# Patient Record
Sex: FEMALE | Race: WHITE | NOT HISPANIC OR LATINO | Employment: FULL TIME | URBAN - NONMETROPOLITAN AREA
[De-identification: names, ages, dates, MRNs, and addresses within clinical notes are randomized per-mention and may not be internally consistent; named-entity substitution may affect disease eponyms.]

---

## 2017-01-26 ENCOUNTER — OFFICE VISIT (OUTPATIENT)
Dept: ORTHOPEDIC SURGERY | Facility: CLINIC | Age: 57
End: 2017-01-26

## 2017-01-26 DIAGNOSIS — M17.12 PRIMARY OSTEOARTHRITIS OF LEFT KNEE: Primary | ICD-10-CM

## 2017-01-26 PROCEDURE — 20610 DRAIN/INJ JOINT/BURSA W/O US: CPT | Performed by: ORTHOPAEDIC SURGERY

## 2017-01-26 PROCEDURE — 99213 OFFICE O/P EST LOW 20 MIN: CPT | Performed by: ORTHOPAEDIC SURGERY

## 2017-01-26 RX ORDER — ESOMEPRAZOLE MAGNESIUM 40 MG/1
CAPSULE, DELAYED RELEASE ORAL
COMMUNITY
Start: 2017-01-18

## 2017-01-26 RX ADMIN — METHYLPREDNISOLONE ACETATE 160 MG: 80 INJECTION, SUSPENSION INTRA-ARTICULAR; INTRALESIONAL; INTRAMUSCULAR; SOFT TISSUE at 09:25

## 2017-01-26 RX ADMIN — LIDOCAINE HYDROCHLORIDE 2 ML: 10 INJECTION, SOLUTION INFILTRATION; PERINEURAL at 09:25

## 2017-02-01 RX ORDER — LIDOCAINE HYDROCHLORIDE 10 MG/ML
2 INJECTION, SOLUTION INFILTRATION; PERINEURAL
Status: COMPLETED | OUTPATIENT
Start: 2017-01-26 | End: 2017-01-26

## 2017-02-01 RX ORDER — METHYLPREDNISOLONE ACETATE 80 MG/ML
160 INJECTION, SUSPENSION INTRA-ARTICULAR; INTRALESIONAL; INTRAMUSCULAR; SOFT TISSUE
Status: COMPLETED | OUTPATIENT
Start: 2017-01-26 | End: 2017-01-26

## 2017-04-25 ENCOUNTER — OFFICE VISIT (OUTPATIENT)
Dept: ORTHOPEDIC SURGERY | Facility: CLINIC | Age: 57
End: 2017-04-25

## 2017-04-25 DIAGNOSIS — M17.12 PRIMARY OSTEOARTHRITIS OF LEFT KNEE: Primary | ICD-10-CM

## 2017-04-25 PROCEDURE — 99213 OFFICE O/P EST LOW 20 MIN: CPT | Performed by: ORTHOPAEDIC SURGERY

## 2017-04-25 PROCEDURE — 20610 DRAIN/INJ JOINT/BURSA W/O US: CPT | Performed by: ORTHOPAEDIC SURGERY

## 2017-04-25 RX ORDER — LIDOCAINE HYDROCHLORIDE 10 MG/ML
2 INJECTION, SOLUTION INFILTRATION; PERINEURAL
Status: COMPLETED | OUTPATIENT
Start: 2017-04-25 | End: 2017-04-25

## 2017-04-25 RX ORDER — METHYLPREDNISOLONE ACETATE 80 MG/ML
160 INJECTION, SUSPENSION INTRA-ARTICULAR; INTRALESIONAL; INTRAMUSCULAR; SOFT TISSUE
Status: COMPLETED | OUTPATIENT
Start: 2017-04-25 | End: 2017-04-25

## 2017-04-25 RX ADMIN — METHYLPREDNISOLONE ACETATE 160 MG: 80 INJECTION, SUSPENSION INTRA-ARTICULAR; INTRALESIONAL; INTRAMUSCULAR; SOFT TISSUE at 14:55

## 2017-04-25 RX ADMIN — LIDOCAINE HYDROCHLORIDE 2 ML: 10 INJECTION, SOLUTION INFILTRATION; PERINEURAL at 14:55

## 2017-04-25 NOTE — PROGRESS NOTES
Chief Complaint   Patient presents with   • Left Knee - Follow-up           HPI the patient is here today for a follow up of her left knee. Patient has pain and discomfort on the medial aspect the knee.  For the past 6-8 weeks she states that she has been pretty miserable.  The knee swells up and wants to give out on her.  She has difficulty in going up and down the steps.  She has difficulty with squatting on the ground.  Knee presents with a sense of tightness and it makes her limp.  When she is limping of the knee her SI joint flares up and is quite miserable for her as well.  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  She is thinking about a total knee arthroplasty in 2018.  The risks and benefits of that surgical intervention have been discussed with the patient in great detail.        There were no vitals filed for this visit.        Review of Systems   All other systems reviewed and are negative.          Physical Exam   Constitutional: She appears well-developed.   HENT:   Head: Normocephalic.   Eyes: Pupils are equal, round, and reactive to light.   Neck: Normal range of motion.   Cardiovascular: Normal rate and intact distal pulses.    Pulmonary/Chest: Effort normal and breath sounds normal.   Abdominal: Soft. Bowel sounds are normal.   Neurological: She is alert. She has normal reflexes.   Skin: Skin is warm.   Psychiatric: She has a normal mood and affect. Her behavior is normal. Thought content normal.   Nursing note and vitals reviewed.          Joint/Body Part Specific Exam:  left knee. Patient has crepitus throughout range of motion. Positive patellar grind test. Mild effusion. Lachman is negative. Pivot shift is negative. Anterior and posterior drawer signs are negative. Significant joint line tenderness is noted on  the medial aspect of the knee. Patient has a varus orientation of the knee. Range of motion in flexion is for 0- 110° degrees. Neurovascular status intact.  Dorsalis pedis and posterior tibial artery pulses are palpable. Common peroneal nerve function is well preserved. Patients gait is cautious and antalgic. Skin and soft tissues are swollen; consistent with synovitis and effusion      X-RAY Report:        Diagnostics:        Rain was seen today for follow-up.    Diagnoses and all orders for this visit:    Primary osteoarthritis of left knee  -     Large Joint Arthrocentesis          Large Joint Arthrocentesis  Date/Time: 4/25/2017 2:55 PM  Consent given by: patient  Site marked: site marked  Timeout: Immediately prior to procedure a time out was called to verify the correct patient, procedure, equipment, support staff and site/side marked as required   Supporting Documentation  Indications: pain   Procedure Details  Location: knee - L knee  Preparation: Patient was prepped and draped in the usual sterile fashion  Needle size: 25 G  Approach: anteromedial  Medications administered: 2 mL lidocaine 1 %; 160 mg methylPREDNISolone acetate 80 MG/ML  Patient tolerance: patient tolerated the procedure well with no immediate complications              Plan:  Injected patient's left knee with a steroid from an anteromedial approach.    Extensive discussion about different treatment modalities including spacing of the steroid injections along with the Synvisc Visco supplementation and her travel plans.    Tablet ibuprofen 600 mg tab 1 by mouth daily for pain and swelling.    Calcium and vitamin D for bone health.    Use a supportive brace to the knee to prevent it from buckling and giving out.    Follow-up in my office in 8 weeks for re-considerations possibly a Synvisc injection at that visit.    We will call the patient's insurance company for Synvisc preauthorization.

## 2017-06-15 ENCOUNTER — CLINICAL SUPPORT (OUTPATIENT)
Dept: ORTHOPEDIC SURGERY | Facility: CLINIC | Age: 57
End: 2017-06-15

## 2017-06-15 DIAGNOSIS — M17.12 PRIMARY OSTEOARTHRITIS OF LEFT KNEE: Primary | ICD-10-CM

## 2017-06-15 PROCEDURE — 20610 DRAIN/INJ JOINT/BURSA W/O US: CPT | Performed by: ORTHOPAEDIC SURGERY

## 2017-06-15 PROCEDURE — 99213 OFFICE O/P EST LOW 20 MIN: CPT | Performed by: ORTHOPAEDIC SURGERY

## 2017-06-15 RX ADMIN — LIDOCAINE HYDROCHLORIDE 2 ML: 10 INJECTION, SOLUTION INFILTRATION; PERINEURAL at 15:35

## 2017-06-15 NOTE — PROGRESS NOTES
Rain Camarena  ?  1960  ?  Chief Complaint   Patient presents with   • Left Knee - Follow-up     ?  HPI the patient is here today for a follow up of her left knee. Patient continues to have a lot of pain and swelling on the medial aspect of the left knee.  She has difficulty with walking on uneven surfaces in going up and down the steps.  She tries to be as active as possible including riding her bike and exercising but a combination of the pain and discomfort from her knee and her SI joint limited her ability to exercise.  She's had fairly good outcomes with both steroid injections as well as Synvisc Visco supplementation and she's been thinking about a knee replacement sometime later this fall.          . Physical Exam   Constitutional: She is oriented to person, place, and time. She appears well-nourished.   HENT:   Head: Atraumatic.   Eyes: EOM are normal.   Neck: Neck supple.   Cardiovascular: Normal rate and intact distal pulses.    Pulmonary/Chest: Effort normal and breath sounds normal.   Abdominal: Soft. Bowel sounds are normal.   Musculoskeletal: Normal range of motion.   Neurological: She is alert and oriented to person, place, and time. She has normal reflexes.   Skin: Skin is warm.   Psychiatric: She has a normal mood and affect. Her behavior is normal. Judgment and thought content normal.   Nursing note and vitals reviewed.      Review of Systems   Constitutional: Negative.    HENT: Negative.    Eyes: Negative.    Respiratory: Negative.    Cardiovascular: Negative.    Gastrointestinal: Negative.    Endocrine: Negative.    Genitourinary: Negative.    Allergic/Immunologic: Negative.    Neurological: Negative.    Hematological: Negative.    Psychiatric/Behavioral: Negative.        Joint/Body Part Specific Exam:   left knee. Patient has crepitus throughout range of motion. Positive patellar grind test. Mild effusion. Lachman is negative. Pivot shift is negative. Anterior and posterior drawer signs  are negative. Significant joint line tenderness is noted on the medial aspect of the knee. Patient has a varus orientation of the knee. Range of motion in flexion is for 0- 120° degrees. Neurovascular status intact.  Dorsalis pedis and posterior tibial artery pulses are palpable. Common peroneal nerve function is well preserved. Patients gait is cautious and antalgic. Skin and soft tissues are swollen; consistent with synovitis and effusion  X-Ray Report:  No x-rays were taken at this visit.   Diagnostics:    Large Joint Arthrocentesis  Date/Time: 6/15/2017 3:35 PM  Consent given by: patient  Site marked: site marked  Timeout: Immediately prior to procedure a time out was called to verify the correct patient, procedure, equipment, support staff and site/side marked as required   Supporting Documentation  Indications: pain   Procedure Details  Location: knee - L knee  Preparation: Patient was prepped and draped in the usual sterile fashion  Needle size: 25 G  Approach: anteromedial  Medications administered: 2 mL lidocaine 1 %; 48 mg hylan 48 MG/6ML  Patient tolerance: patient tolerated the procedure well with no immediate complications        ?  ?  Plan      · Ice pack for 48 hrs     · Injected patients Left knee joint with Visco supplementation with Synvisc    · Ace wrap for swelling PRN    · Elevation for swelling PRN    · Tablet Ibuprofen 600 mg P.O. BID x 5 Days with meals    · Call office for any problems  With procedure    · Home Physical Therapy Program discussed with patient    · F/U in 3 months for Re-evaluation.  ·   · Patient is going to need a total knee arthroplasty on the left side because her quality of life is quite negative and she would like to get out and exercise and be relatively pain-free..  ·   · Risks and benefits of surgical intervention and been discussed with the patient in extensive detail and she understands and accepts.  ·   · She will let us know when she is ready for surgical  intervention.

## 2017-06-23 RX ORDER — LIDOCAINE HYDROCHLORIDE 10 MG/ML
2 INJECTION, SOLUTION INFILTRATION; PERINEURAL
Status: COMPLETED | OUTPATIENT
Start: 2017-06-15 | End: 2017-06-15

## 2017-10-10 ENCOUNTER — OFFICE VISIT (OUTPATIENT)
Dept: ORTHOPEDIC SURGERY | Facility: CLINIC | Age: 57
End: 2017-10-10

## 2017-10-10 DIAGNOSIS — M17.12 PRIMARY OSTEOARTHRITIS OF LEFT KNEE: Primary | ICD-10-CM

## 2017-10-10 PROCEDURE — 20610 DRAIN/INJ JOINT/BURSA W/O US: CPT | Performed by: ORTHOPAEDIC SURGERY

## 2017-10-10 PROCEDURE — 73560 X-RAY EXAM OF KNEE 1 OR 2: CPT | Performed by: ORTHOPAEDIC SURGERY

## 2017-10-10 PROCEDURE — 99213 OFFICE O/P EST LOW 20 MIN: CPT | Performed by: ORTHOPAEDIC SURGERY

## 2017-10-10 RX ADMIN — LIDOCAINE HYDROCHLORIDE 2 ML: 10 INJECTION, SOLUTION INFILTRATION; PERINEURAL at 15:45

## 2017-10-10 RX ADMIN — METHYLPREDNISOLONE ACETATE 160 MG: 80 INJECTION, SUSPENSION INTRA-ARTICULAR; INTRALESIONAL; INTRAMUSCULAR; SOFT TISSUE at 15:45

## 2017-10-10 NOTE — PROGRESS NOTES
Rain Camarena  ?  1960  ?  Chief Complaint   Patient presents with   • Left Knee - Follow-up     ?  HPI the patient is here today for left knee follow up For her pain and discomfort.  She states that her quality of life is very negative because of the progressive pain and discomfort on the knee.  She had Synvisc Visco supplementation on Sabiha 15, 2017.  That did seem to help her to some degree but her quality of life is very negative because of not being able to participate in the activities that she would like to.  She states that she recently went to a [employer] park in [place] and was essentially unable to perform any form of hiking or exercise.  She states that she loves to walk outside for recreation and is unable to do so because she cannot go the distance that she would like to on account of her knee pain and discomfort.  She is limping just about all the time.  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  Most of her symptoms appear to be from the patellofemoral joint.  She has difficulty in going up and down the steps and difficulty in squatting on the ground.  She states that her shoulder bothers her significantly on the right side as well.  She has weakness in abduction and difficulty in reaching into the overhead position without too much pain and discomfort.    Physical Exam    Review of Systems    Joint/Body Part Specific Exam:   left knee. Patient has crepitus throughout range of motion. Positive patellar grind test. Mild effusion. Lachman is negative. Pivot shift is negative. Anterior and posterior drawer signs are negative. Significant joint line tenderness is noted on the medial aspect of the knee. Patient has a varus orientation of the knee. Range of motion in flexion is for 0- 100° degrees. Neurovascular status intact.  Dorsalis pedis and posterior tibial artery pulses are palpable. Common peroneal  nerve function is well preserved. Patients gait is cautious and antalgic. Skin and soft tissues are swollen; consistent with synovitis and effusion.  She has difficulty in getting out of the chair starting the gait cycle.  Her first few steps are associated with a distinct limp.    right Shoulder. Patient has signs of impingement with internal and external rotation. There is a lot of pain and tenderness for the patient over the subcromial bursa. Rivas’s sign is positive. Neer sign is positive. Forward flexion is 0-120° degrees, abduction is 0-130 degrees, external rotation is 0-40° degrees. Rotator cuff function is fairly well preserved except for the impingement at 90 degrees. Apprehension sign is negative. Axillary nerve function is well preserved. Radial artery pulses are palpable. There is no evidence of multidirectional instability. Sulcus sign is negative. Drop arm sign is negative. The patient has some ill-defined tenderness over the greater tuberosity of the humerus. The pain level is 5.  X-Ray Report:    Diagnostics:    Large Joint Arthrocentesis  Date/Time: 10/10/2017 3:45 PM  Consent given by: patient  Site marked: site marked  Timeout: Immediately prior to procedure a time out was called to verify the correct patient, procedure, equipment, support staff and site/side marked as required   Supporting Documentation  Indications: pain   Procedure Details  Location: knee - L knee  Preparation: Patient was prepped and draped in the usual sterile fashion  Needle size: 25 G  Approach: anteromedial  Medications administered: 2 mL lidocaine 1 %; 160 mg methylPREDNISolone acetate 80 MG/ML  Patient tolerance: patient tolerated the procedure well with no immediate complications        ?  ?  Plan      · Ice pack for 48 hrs     · Injected patients left knee joint with Steroid    · Ace wrap for swelling PRN    · Elevation for swelling PRN    · Tablet Ibuprofen 600 mg P.O. BID x 5 Days with meals    · Call office for  any problems  With procedure    · Home Physical Therapy Program discussed with patient    · F/U in 3 months for Re-evaluation.  ·   · Calcium and vitamin D for bone health.  ·   · Glucosamine and chondroitin discussed with the patient for improving the cartilage health.  ·   · Home-based exercise program the shoulder to prevent a frozen shoulder.  ·   · Patient wants to proceed with a total knee arthroplasty on the left side in May 2018.  I certainly agree with this timeline.  ·   · Risks and benefits of surgical intervention discussed with the patient and her  who is present in the office with her today.  ·   · Use a brace on the knee to prevent it from buckling and giving out.

## 2017-10-15 RX ORDER — METHYLPREDNISOLONE ACETATE 80 MG/ML
160 INJECTION, SUSPENSION INTRA-ARTICULAR; INTRALESIONAL; INTRAMUSCULAR; SOFT TISSUE
Status: COMPLETED | OUTPATIENT
Start: 2017-10-10 | End: 2017-10-10

## 2017-10-15 RX ORDER — LIDOCAINE HYDROCHLORIDE 10 MG/ML
2 INJECTION, SOLUTION INFILTRATION; PERINEURAL
Status: COMPLETED | OUTPATIENT
Start: 2017-10-10 | End: 2017-10-10

## 2018-01-16 ENCOUNTER — CLINICAL SUPPORT (OUTPATIENT)
Dept: ORTHOPEDIC SURGERY | Facility: CLINIC | Age: 58
End: 2018-01-16

## 2018-01-16 DIAGNOSIS — M17.12 PRIMARY OSTEOARTHRITIS OF LEFT KNEE: Primary | ICD-10-CM

## 2018-01-16 PROCEDURE — 99213 OFFICE O/P EST LOW 20 MIN: CPT | Performed by: ORTHOPAEDIC SURGERY

## 2018-01-16 PROCEDURE — 20610 DRAIN/INJ JOINT/BURSA W/O US: CPT | Performed by: ORTHOPAEDIC SURGERY

## 2018-01-16 RX ADMIN — METHYLPREDNISOLONE ACETATE 160 MG: 80 INJECTION, SUSPENSION INTRA-ARTICULAR; INTRALESIONAL; INTRAMUSCULAR; SOFT TISSUE at 13:05

## 2018-01-16 RX ADMIN — LIDOCAINE HYDROCHLORIDE 2 ML: 10 INJECTION, SOLUTION EPIDURAL; INFILTRATION; INTRACAUDAL; PERINEURAL at 13:05

## 2018-01-16 NOTE — PROGRESS NOTES
Rain Camarena  ?  1960  ?  Chief Complaint   Patient presents with   • Left Knee - Follow-up     ?  HPI the patient is here today for a follow up of her left knee. Patient has pain and discomfort on the medial aspect of the knee.  She has difficulty going up and down the steps.  She states that the Synvisc injection did not help her when she had received several months ago.  In any event, the insurance company denied her Synvisc injection.  She states that the steroid injection intra-articularly helps her significantly.  She has had a consultation with a rheumatologist specialist in [facility].  Patient is getting periodic checkups on a blood work and serum levels with that physician.  We have had an extensive discussion about an exercise protocol for this patient and, with a daily scheduled that would be helpful for managing her symptoms with conservative, nonoperative care.  Eventually, this patient is going to need a total knee arthroplasty and there is no doubt about that.  However, at this time she would like to proceed with conservative, nonoperative care and I totally agree with that.  Her SI joints have not been bothering her too much and therefore she is not asking those issues to be addressed at today's visit.    Physical Exam   Constitutional: She is oriented to person, place, and time. She appears well-nourished.   HENT:   Head: Atraumatic.   Eyes: EOM are normal.   Neck: Neck supple.   Cardiovascular: Normal rate, regular rhythm, normal heart sounds and intact distal pulses.    Pulmonary/Chest: Effort normal.   Abdominal: Bowel sounds are normal.   Musculoskeletal: She exhibits edema, tenderness and deformity.   Neurological: She is alert and oriented to person, place, and time. She has normal reflexes.   Skin: Skin is dry.   Psychiatric: She has a normal mood and affect. Her behavior is normal. Judgment and thought content normal.   Nursing note and vitals reviewed.      Review of  Systems   Constitutional: Negative.    HENT: Negative.    Eyes: Negative.    Respiratory: Negative.    Cardiovascular: Negative.    Gastrointestinal: Negative.    Endocrine: Negative.    Genitourinary: Negative.    Musculoskeletal: Negative.    Skin: Negative.    Allergic/Immunologic: Negative.    Neurological: Negative.    Hematological: Negative.    Psychiatric/Behavioral: Negative.        Joint/Body Part Specific Exam:   left knee. Patient has crepitus throughout range of motion. Positive patellar grind test. Mild effusion. Lachman is negative. Pivot shift is negative. Anterior and posterior drawer signs are negative. Significant joint line tenderness is noted on the medial aspect of the knee. Patient has a varus orientation of the knee. Range of motion in flexion is for 0- 125° degrees. Neurovascular status intact.  Dorsalis pedis and posterior tibial artery pulses are palpable. Common peroneal nerve function is well preserved. Patients gait is cautious and antalgic. Skin and soft tissues are swollen; consistent with synovitis and effusion.  Patient has a distinct limp when she first gets out of the chair and starts to walk.  Once she has taken a few steps her limp does get better.  There is a distinct radiation of the pain along the medial aspect of the tibia.  There is also some swelling of the Pes Anserine tendons.  X-Ray Report:    Diagnostics:    Large Joint Arthrocentesis  Date/Time: 1/16/2018 1:05 PM  Consent given by: patient  Site marked: site marked  Timeout: Immediately prior to procedure a time out was called to verify the correct patient, procedure, equipment, support staff and site/side marked as required   Supporting Documentation  Indications: pain   Procedure Details  Location: knee - L knee  Preparation: Patient was prepped and draped in the usual sterile fashion  Needle size: 25 G  Approach: anteromedial  Medications administered: 2 mL lidocaine PF 1% 1 %; 160 mg methylPREDNISolone acetate 80  MG/ML  Patient tolerance: patient tolerated the procedure well with no immediate complications        ?  ?  Plan      · Ice pack for 48 hrs     · Injected patients left knee joint with Steroid    · Ace wrap for swelling PRN    · Elevation for swelling PRN    · Tablet Ibuprofen 600 mg P.O. BID x 5 Days with meals    · Call office for any problems  With procedure    · Home Physical Therapy Program discussed with patient    · F/U in 3 months for Re-evaluation.  ·   · Weekly exercise schedule charted out for the patient.  ·   · Continue to follow-up with the rheumatology specialist.  ·   · [employer] exercise program to help with management of the symptoms off the low back pain and the SI joint syndrome.

## 2018-01-18 RX ORDER — METHYLPREDNISOLONE ACETATE 80 MG/ML
160 INJECTION, SUSPENSION INTRA-ARTICULAR; INTRALESIONAL; INTRAMUSCULAR; SOFT TISSUE
Status: COMPLETED | OUTPATIENT
Start: 2018-01-16 | End: 2018-01-16

## 2018-01-18 RX ORDER — LIDOCAINE HYDROCHLORIDE 10 MG/ML
2 INJECTION, SOLUTION EPIDURAL; INFILTRATION; INTRACAUDAL; PERINEURAL
Status: COMPLETED | OUTPATIENT
Start: 2018-01-16 | End: 2018-01-16

## 2018-04-26 ENCOUNTER — CLINICAL SUPPORT (OUTPATIENT)
Dept: ORTHOPEDIC SURGERY | Facility: CLINIC | Age: 58
End: 2018-04-26

## 2018-04-26 VITALS — TEMPERATURE: 97.3 F | BODY MASS INDEX: 30.61 KG/M2 | WEIGHT: 195 LBS | HEIGHT: 67 IN

## 2018-04-26 DIAGNOSIS — M25.562 CHRONIC PAIN OF LEFT KNEE: ICD-10-CM

## 2018-04-26 DIAGNOSIS — G89.29 CHRONIC PAIN OF LEFT KNEE: ICD-10-CM

## 2018-04-26 DIAGNOSIS — M17.12 PRIMARY OSTEOARTHRITIS OF LEFT KNEE: Primary | ICD-10-CM

## 2018-04-26 DIAGNOSIS — M54.2 CERVICAL SPINE PAIN: ICD-10-CM

## 2018-04-26 PROCEDURE — 20610 DRAIN/INJ JOINT/BURSA W/O US: CPT | Performed by: ORTHOPAEDIC SURGERY

## 2018-04-26 PROCEDURE — 99213 OFFICE O/P EST LOW 20 MIN: CPT | Performed by: ORTHOPAEDIC SURGERY

## 2018-04-26 RX ORDER — METHYLPREDNISOLONE ACETATE 80 MG/ML
160 INJECTION, SUSPENSION INTRA-ARTICULAR; INTRALESIONAL; INTRAMUSCULAR; SOFT TISSUE
Status: COMPLETED | OUTPATIENT
Start: 2018-04-26 | End: 2018-04-26

## 2018-04-26 RX ORDER — LIDOCAINE HYDROCHLORIDE 10 MG/ML
2 INJECTION, SOLUTION EPIDURAL; INFILTRATION; INTRACAUDAL; PERINEURAL
Status: COMPLETED | OUTPATIENT
Start: 2018-04-26 | End: 2018-04-26

## 2018-04-26 RX ADMIN — METHYLPREDNISOLONE ACETATE 160 MG: 80 INJECTION, SUSPENSION INTRA-ARTICULAR; INTRALESIONAL; INTRAMUSCULAR; SOFT TISSUE at 08:54

## 2018-04-26 RX ADMIN — LIDOCAINE HYDROCHLORIDE 2 ML: 10 INJECTION, SOLUTION EPIDURAL; INFILTRATION; INTRACAUDAL; PERINEURAL at 08:54

## 2018-04-26 NOTE — PROGRESS NOTES
Chief Complaint   Patient presents with   • Left Knee - Follow-up         HPI  Patient is here today for a follow up of her left knee.She has pain and discomfort on the posterior and medial aspect of the knee.  She has difficulty going up and down the steps.  She has difficulty with squatting on the ground.  Cross body activities bother the patient significantly.  She states that her arthritis has flared up systematically.  She is having pain and discomfort in the cervical spine.  The pain radiates down into the shoulder region also.  She has a sense of muscle spasm over the real-scapular muscles.  She denies any history of trauma.  She does use a topical anti-inflammatory cream that is helpful with managing her symptoms.  She states that she visited the physical therapy department in the past and that was useful modality of control of pain for her.  We are trying to manage her knee arthritis and inflammation in a conservative fashion for as long as possible.          Vitals:    04/26/18 0850   Temp: 97.3 °F (36.3 °C)           Joint/Body Part Specific Exam:  left knee. Patient has crepitus throughout range of motion. Positive patellar grind test. Mild effusion. Lachman is negative. Pivot shift is negative. Anterior and posterior drawer signs are negative. Significant joint line tenderness is noted on the medial aspect of the knee. Patient has a varus orientation of the knee. There is fullness and tenderness in the Popliteal fossa. Mild distention of a Popliteal cyst is noted in this location. Range of motion in flexion is from 0- 120° degrees. Neurovascular status is intact.  Dorsalis pedis and posterior tibial artery pulses are palpable. Common peroneal nerve function is well preserved. Patient's gait is cautious and antalgic. Skin and soft tissues are mildly swollen, consistent with synovitis and effusion. The patient has a significant limp with the first few steps after starting the gait cycle. Getting out of a  chair takes a lot of effort due to pain on knee flexion.    Cervical Spine: Skin and soft tissues are mildly swollen. Deep tendon reflexes bilateral, symmetric and equal on both upper and lower extremities. Cough impulse is positive and invokes pain for the patient. No objective motor or sensory function deficit is present. No long tract signs re evident clinically.There is no evidence of myelopathy. No bowel or bladder deficit. Mild spasm of erector spinae muscle is present.Lateral rotations of the spine are limited in range of motion and painful for the patient. No evidence of long tract signs is noted.  bilaterally Sided lateral rotation is associated with pain and discomfort.  X-RAY REPORT:        Rain was seen today for follow-up.    Diagnoses and all orders for this visit:    Primary osteoarthritis of left knee    Chronic pain of left knee            Large Joint Arthrocentesis  Date/Time: 4/26/2018 8:54 AM  Consent given by: patient  Site marked: site marked  Timeout: Immediately prior to procedure a time out was called to verify the correct patient, procedure, equipment, support staff and site/side marked as required   Supporting Documentation  Indications: pain   Procedure Details  Location: knee - L knee  Preparation: Patient was prepped and draped in the usual sterile fashion  Needle size: 25 G  Approach: anteromedial  Medications administered: 160 mg methylPREDNISolone acetate 80 MG/ML; 2 mL lidocaine PF 1% 1 %  Patient tolerance: patient tolerated the procedure well with no immediate complications              Instructions:      Plan:Injected patient's left knee from an anteromedial approach with a steroid.    Use a supportive brace on the knee to prevent it from buckling and giving out.    Tablet Tylenol 650 mg tab 1 by mouth daily at bedtime for pain swelling and discomfort.    Topical application of a anti-inflammatory salve to help with pain and inflammation.    Cervical spine stretching exercises  including a [employer] exercise program.    Note for physical therapy given to the patient for 2 visits to the PT department to see if she can be helped by some modalities of traction.    Loperamide my office in 3 months for reevaluation of the knee problems.

## 2018-07-26 ENCOUNTER — CLINICAL SUPPORT (OUTPATIENT)
Dept: ORTHOPEDIC SURGERY | Facility: CLINIC | Age: 58
End: 2018-07-26

## 2018-07-26 VITALS — WEIGHT: 185 LBS | HEIGHT: 69 IN | BODY MASS INDEX: 27.4 KG/M2

## 2018-07-26 DIAGNOSIS — M17.12 PRIMARY OSTEOARTHRITIS OF LEFT KNEE: Primary | ICD-10-CM

## 2018-07-26 DIAGNOSIS — M25.511 ACUTE PAIN OF RIGHT SHOULDER: ICD-10-CM

## 2018-07-26 DIAGNOSIS — M54.2 CERVICAL SPINE PAIN: ICD-10-CM

## 2018-07-26 PROCEDURE — 20610 DRAIN/INJ JOINT/BURSA W/O US: CPT | Performed by: ORTHOPAEDIC SURGERY

## 2018-07-26 PROCEDURE — 99213 OFFICE O/P EST LOW 20 MIN: CPT | Performed by: ORTHOPAEDIC SURGERY

## 2018-07-26 RX ORDER — METHYLPREDNISOLONE ACETATE 80 MG/ML
160 INJECTION, SUSPENSION INTRA-ARTICULAR; INTRALESIONAL; INTRAMUSCULAR; SOFT TISSUE
Status: COMPLETED | OUTPATIENT
Start: 2018-07-26 | End: 2018-07-26

## 2018-07-26 RX ORDER — LIDOCAINE HYDROCHLORIDE 10 MG/ML
2 INJECTION, SOLUTION INFILTRATION; PERINEURAL
Status: COMPLETED | OUTPATIENT
Start: 2018-07-26 | End: 2018-07-26

## 2018-07-26 RX ORDER — TRAMADOL HYDROCHLORIDE 50 MG/1
TABLET ORAL
COMMUNITY
Start: 2018-06-11 | End: 2023-03-13

## 2018-07-26 RX ORDER — DOXEPIN HYDROCHLORIDE 150 MG/1
CAPSULE ORAL
COMMUNITY
Start: 2018-04-24 | End: 2023-03-13

## 2018-07-26 RX ORDER — BACLOFEN 10 MG/1
TABLET ORAL
COMMUNITY
Start: 2018-06-12 | End: 2019-12-03 | Stop reason: SDUPTHER

## 2018-07-26 RX ADMIN — METHYLPREDNISOLONE ACETATE 160 MG: 80 INJECTION, SUSPENSION INTRA-ARTICULAR; INTRALESIONAL; INTRAMUSCULAR; SOFT TISSUE at 08:55

## 2018-07-26 RX ADMIN — LIDOCAINE HYDROCHLORIDE 2 ML: 10 INJECTION, SOLUTION INFILTRATION; PERINEURAL at 08:55

## 2018-07-26 NOTE — PROGRESS NOTES
Chief Complaint   Patient presents with   • Left Knee - Follow-up           HPI  The patient is here today for a follow up of her left knee. She states that she is doing a lot better with her knee.  She is exercising with an elliptical exerciser.  Her range of motion is improved significantly on the knee.  She has very minimal pain and discomfort.  She has been able to exercise and lose some weight which is making her overall disposition much better.  She does have some issues with her right shoulder.  There is some weakness in abduction.  Specifically external rotation bothers the patient to some degree.  She does not have any signs of a frozen shoulder just yet but I can see where she could easily develop that pathology going forward.  She has a significant kyphotic attitude of the cervical spine and therefore I am concerned about impingement and radiculopathy that could overlap her shoulder function.  We will refer her to physical therapy to improve the dynamics of her upper extremity.      There were no vitals filed for this visit.        Review of Systems   Constitutional: Negative.    HENT: Negative.    Eyes: Negative.    Respiratory: Negative.    Cardiovascular: Negative.    Gastrointestinal: Negative.    Endocrine: Negative.    Genitourinary: Negative.    Musculoskeletal: Positive for joint swelling.   Skin: Negative.    Allergic/Immunologic: Negative.    Neurological: Negative.    Hematological: Negative.    Psychiatric/Behavioral: Negative.            Physical Exam   Constitutional: She appears well-nourished.   HENT:   Head: Atraumatic.   Eyes: EOM are normal.   Neck: Neck supple.   Cardiovascular: Regular rhythm and normal heart sounds.    Pulmonary/Chest: Effort normal and breath sounds normal.   Abdominal: Bowel sounds are normal.   Musculoskeletal: Normal range of motion. She exhibits edema and tenderness.   Neurological: She is alert.   Skin: Skin is warm. Capillary refill takes 2 to 3 seconds.    Psychiatric: She has a normal mood and affect. Her behavior is normal. Judgment and thought content normal.   Nursing note and vitals reviewed.          Joint/Body Part Specific Exam:  left knee. Patient has crepitus throughout range of motion. Positive patellar grind test. Mild effusion. Lachman is negative. Pivot shift is negative. Anterior and posterior drawer signs are negative. Significant joint line tenderness is noted on the medial aspect of the knee. Patient has a varus orientation of the knee. There is fullness and tenderness in the Popliteal fossa. Mild distention of a Popliteal cyst is noted in this location. Range of motion in flexion is from 0- 120° degrees. Neurovascular status is intact.  Dorsalis pedis and posterior tibial artery pulses are palpable. Common peroneal nerve function is well preserved. Patient's gait is cautious and antalgic. Skin and soft tissues are mildly swollen, consistent with synovitis and effusion. The patient has a significant limp with the first few steps after starting the gait cycle. Getting out of a chair takes a lot of effort due to pain on knee flexion.    right Shoulder. Patient has signs of impingement with internal and external rotation. There is a lot of pain and tenderness for the patient over the subcromial bursa. Rivas’s sign is positive. Neer sign is positive. Forward flexion is 0-120° degrees, abduction is 0-110° degrees, external rotation is 0-40° degrees. Rotator cuff function is fairly well preserved except for the impingement at 90 degrees. Apprehension sign is negative. Axillary nerve function is well preserved. Radial artery pulses are palpable. There is no evidence of multidirectional instability. Sulcus sign is negative. Drop arm sign is negative. The patient has some ill-defined tenderness over the greater tuberosity of the humerus. The pain level is 4.  X-RAY Report:        Diagnostics:        Rain was seen today for follow-up.    Diagnoses and all  orders for this visit:    Primary osteoarthritis of left knee  -     Large Joint Arthrocentesis    Cervical spine pain  -     Ambulatory Referral to Physical Therapy Evaluate and treat    Acute pain of right shoulder  -     Ambulatory Referral to Physical Therapy Evaluate and treat            Large Joint Arthrocentesis  Date/Time: 7/26/2018 8:55 AM  Consent given by: patient  Site marked: site marked  Timeout: Immediately prior to procedure a time out was called to verify the correct patient, procedure, equipment, support staff and site/side marked as required   Supporting Documentation  Indications: pain   Procedure Details  Location: knee - L knee  Preparation: Patient was prepped and draped in the usual sterile fashion  Needle size: 25 G  Approach: anteromedial  Medications administered: 2 mL lidocaine 1 %; 160 mg methylPREDNISolone acetate 80 MG/ML  Patient tolerance: patient tolerated the procedure well with no immediate complications              Plan:  Injected patient's left knee with a steroid from an anteromedial approach.    Stretching and strengthening exercises of the knee.    Use a brace as needed.    I have recommended physical therapy to her right shoulder to prevent the onset of a frozen shoulder including stabilization of the scapula and proper dynamics of the shoulder and the C-spine.    Tablet ibuprofen 600 mg orally twice a day for pain swelling and discomfort.    Continue to use the elliptical and other non-impact exercises.    Follow-up in my office in 3 months for reevaluation.

## 2018-10-25 ENCOUNTER — CLINICAL SUPPORT (OUTPATIENT)
Dept: ORTHOPEDIC SURGERY | Facility: CLINIC | Age: 58
End: 2018-10-25

## 2018-10-25 DIAGNOSIS — M17.12 PRIMARY OSTEOARTHRITIS OF LEFT KNEE: Primary | ICD-10-CM

## 2018-10-25 PROCEDURE — 20610 DRAIN/INJ JOINT/BURSA W/O US: CPT | Performed by: ORTHOPAEDIC SURGERY

## 2018-10-25 RX ORDER — LIDOCAINE HYDROCHLORIDE 10 MG/ML
1 INJECTION, SOLUTION EPIDURAL; INFILTRATION; INTRACAUDAL; PERINEURAL
Status: COMPLETED | OUTPATIENT
Start: 2018-10-25 | End: 2018-10-25

## 2018-10-25 RX ORDER — METHYLPREDNISOLONE ACETATE 80 MG/ML
160 INJECTION, SUSPENSION INTRA-ARTICULAR; INTRALESIONAL; INTRAMUSCULAR; SOFT TISSUE
Status: COMPLETED | OUTPATIENT
Start: 2018-10-25 | End: 2018-10-25

## 2018-10-25 RX ADMIN — METHYLPREDNISOLONE ACETATE 160 MG: 80 INJECTION, SUSPENSION INTRA-ARTICULAR; INTRALESIONAL; INTRAMUSCULAR; SOFT TISSUE at 09:40

## 2018-10-25 RX ADMIN — LIDOCAINE HYDROCHLORIDE 1 ML: 10 INJECTION, SOLUTION EPIDURAL; INFILTRATION; INTRACAUDAL; PERINEURAL at 09:40

## 2018-10-25 NOTE — PROGRESS NOTES
Rain Camarena  ?  1960  ?  Chief Complaint   Patient presents with   • Left Knee - Follow-up     ?  HPI the patient is here today for a left knee injection .  The patient states that she has switched to an anti-inflammatory diet which is low in carbohydrates.  This guide Trish which has helped her symptoms tremendously.  Not only is her knee feeling better her low back pain is also much better.  She has lost some weight.  Overall, he states that she would like to postpone surgery as long as possible.  I certainly agree with the nonoperative management of her knee condition at this point.    Physical Exam    Review of Systems    Joint/Body Part Specific Exam:left knee. Patient has crepitus throughout range of motion. Positive patellar grind test. Mild effusion. Lachman is negative. Pivot shift is negative. Anterior and posterior drawer signs are negative. Significant joint line tenderness is noted on the medial aspect of the knee. Patient has a varus orientation of the knee. There is fullness and tenderness in the Popliteal fossa. Mild distention of a Popliteal cyst is noted in this location. Range of motion in flexion is from 0- 120° degrees. Neurovascular status is intact.  Dorsalis pedis and posterior tibial artery pulses are palpable. Common peroneal nerve function is well preserved. Patient's gait is cautious and antalgic. Skin and soft tissues are mildly swollen, consistent with synovitis and effusion. The patient has a significant limp with the first few steps after starting the gait cycle. Getting out of a chair takes a lot of effort due to pain on knee flexion.     X-Ray Report:    Diagnostics:    Large Joint Arthrocentesis  Date/Time: 10/25/2018 9:40 AM  Consent given by: patient  Site marked: site marked  Timeout: Immediately prior to procedure a time out was called to verify the correct patient, procedure, equipment, support staff and site/side marked as required   Supporting  Documentation  Indications: pain   Procedure Details  Location: knee - L knee  Preparation: Patient was prepped and draped in the usual sterile fashion  Needle size: 25 G  Approach: anteromedial  Medications administered: 160 mg methylPREDNISolone acetate 80 MG/ML; 1 mL lidocaine PF 1% 1 %  Patient tolerance: patient tolerated the procedure well with no immediate complications        ?  ?  Plan      · Ice pack for 48 hrs     · Injected patients left knee  joint with Steroid    · Ace wrap for swelling PRN    · Elevation for swelling PRN    · Tablet Ibuprofen 600 mg P.O. BID x 5 Days with meals    · Call office for any problems  With procedure    · Home Physical Therapy Program discussed with patient    · F/U in 3 months for Re-evaluation.  ·   · Use a supportive brace to prevent buckling and giving out of the knee.  ·   · We have discussed about the possibility of total knee arthroplasty but that will be a last resort option for the patient based on progression of her symptoms.  ·   · Discussed with the patient about Synvisc Visco supplementation as well and she understands and accepts that situation.

## 2019-01-31 ENCOUNTER — OFFICE VISIT (OUTPATIENT)
Dept: ORTHOPEDIC SURGERY | Facility: CLINIC | Age: 59
End: 2019-01-31

## 2019-01-31 DIAGNOSIS — M17.12 PRIMARY OSTEOARTHRITIS OF LEFT KNEE: Primary | ICD-10-CM

## 2019-01-31 DIAGNOSIS — M51.16 LUMBAR DISC DISEASE WITH RADICULOPATHY: ICD-10-CM

## 2019-01-31 PROCEDURE — 99213 OFFICE O/P EST LOW 20 MIN: CPT | Performed by: ORTHOPAEDIC SURGERY

## 2019-03-05 ENCOUNTER — CLINICAL SUPPORT (OUTPATIENT)
Dept: ORTHOPEDIC SURGERY | Facility: CLINIC | Age: 59
End: 2019-03-05

## 2019-03-05 VITALS — TEMPERATURE: 97.4 F | WEIGHT: 188 LBS | HEIGHT: 67 IN | BODY MASS INDEX: 29.51 KG/M2

## 2019-03-05 DIAGNOSIS — M17.12 PRIMARY OSTEOARTHRITIS OF LEFT KNEE: Primary | ICD-10-CM

## 2019-03-05 PROCEDURE — 20610 DRAIN/INJ JOINT/BURSA W/O US: CPT | Performed by: ORTHOPAEDIC SURGERY

## 2019-03-05 RX ADMIN — LIDOCAINE HYDROCHLORIDE 2 ML: 10 INJECTION, SOLUTION INFILTRATION; PERINEURAL at 16:32

## 2019-03-05 RX ADMIN — METHYLPREDNISOLONE ACETATE 160 MG: 80 INJECTION, SUSPENSION INTRA-ARTICULAR; INTRALESIONAL; INTRAMUSCULAR; SOFT TISSUE at 16:32

## 2019-03-05 NOTE — PROGRESS NOTES
INJECTION    Patient: Rain Camarena  YOB: 1960    Chief Complaints: Pain and discomfort over the medial aspect of the left knee.  She has difficulty going up and down the steps.  She would like to walk for exercise sometimes is able to proximal part of the tibia as well.  She denies any recent history of trauma to the knee.  She would like to defer knee replacement surgery as long as possible and I certainly agree with that    History of Present Illness: Patient is seen in the office today for left knee pain. The pain is medial and lateral joint tenderness.  It has been progressive in nature but remains intermittent .  Worsened by prolonged standing or walking and squatting activities. Has had improvement in the past with ice, rest, injections NSAIDS.     This problem is not new to this examiner.     Allergies:   Allergies   Allergen Reactions   • Bactine [Lidocaine-Benzalkonium]    • Clindamycin/Lincomycin    • Hydrocodone    • Ibuprofen        Medications:   Home Medications:  Current Outpatient Medications on File Prior to Visit   Medication Sig   • ASCORBIC ACID PO Take 150 mg by mouth.   • B Complex Vitamins (VITAMIN B COMPLEX) capsule capsule Take  by mouth daily.   • baclofen (LIORESAL) 10 MG tablet    • baclofen (LIORESAL) 20 MG tablet    • BIOTIN PO Take 10,000 mcg by mouth daily.   • CALCIUM PO Take 630 mg by mouth.   • Cholecalciferol (VITAMIN D-3 PO) Take 500 Units by mouth.   • cholecalciferol (VITAMIN D3) 400 UNITS tablet Take 400 Units by mouth daily.   • Cyanocobalamin (VITAMIN B12 PO) Take 15 mcg by mouth.   • doxepin (SINEquan) 150 MG capsule    • esomeprazole (nexIUM) 40 MG capsule    • estradiol (MINIVELLE, VIVELLE-DOT) 0.1 MG/24HR patch Place 1 patch on the skin.   • fluticasone (FLONASE) 50 MCG/ACT nasal spray    • FOLIC ACID PO Take 400 mcg by mouth.   • Glucosamine-Chondroit-Vit C-Mn (GLUCOSAMINE 1500 COMPLEX PO) Take  by mouth.   • Glucosamine-Chondroitin (OSTEO BI-FLEX  REGULAR STRENGTH PO) Take  by mouth.   • IRON PO Take 65 mg by mouth daily.   • Loratadine 10 MG capsule Take  by mouth daily.   • LUTEIN PO Take 5 mg by mouth.   • Multiple Vitamins-Minerals (OCUVITE ADULT 50+ PO) Take  by mouth daily.   • Multiple Vitamins-Minerals (WOMENS MULTI PO) Take  by mouth.   • NIACIN PO Take 25 mg by mouth.   • Omega-3 Fatty Acids (OMEGA-3 PO) Take  by mouth.   • PANTOTHENIC ACID PO Take 5.5 mg by mouth.   • Probiotic Product (PROBIOTIC DAILY PO) Take  by mouth.   • Pyridoxine HCl (VITAMIN B6 PO) Take 2 mg by mouth.   • RESTASIS 0.05 % ophthalmic emulsion    • RIBOFLAVIN PO Take 20 mg by mouth.   • Thiamine Mononitrate 100 MG tablet Take 100 mg by mouth.   • traMADol (ULTRAM) 50 MG tablet    • Zeaxanthin powder      No current facility-administered medications on file prior to visit.      Current Medications:  Scheduled Meds:  PRN Meds:.    I have reviewed the patient's medical history in detail and updated the computerized patient record.  Review and summarization of old records include:    Past Medical History:   Diagnosis Date   • Anemia    • Anxiety    • Depression    • Gastritis    • Hernia    • History of stomach ulcers    • Osteoarthritis      Past Surgical History:   Procedure Laterality Date   • GALLBLADDER SURGERY      2012   • HYSTERECTOMY      2008 LAPAROSCOPIC   • LAPAROSCOPIC GASTRIC BYPASS      2010     Social History     Occupational History   • Not on file   Tobacco Use   • Smoking status: Never Smoker   Substance and Sexual Activity   • Alcohol use: Yes     Comment: ONCE EVERY 2-4 WEEKS   • Drug use: Not on file   • Sexual activity: Not on file      Social History     Social History Narrative   • Not on file     Family History   Problem Relation Age of Onset   • Hypertension Other    • Diabetes Other    • Heart disease Other    • Cancer Other         ANAL       Review of Systems        Allergies:   Allergies   Allergen Reactions   • Bactine [Lidocaine-Benzalkonium]    •  "Clindamycin/Lincomycin    • Hydrocodone    • Ibuprofen        Wt Readings from Last 3 Encounters:   03/05/19 85.3 kg (188 lb)   07/26/18 83.9 kg (185 lb)   04/26/18 88.5 kg (195 lb)     Ht Readings from Last 3 Encounters:   03/05/19 170.2 cm (67\")   07/26/18 175.3 cm (69\")   04/26/18 168.9 cm (66.5\")     Body mass index is 29.44 kg/m².  Facility age limit for growth percentiles is 20 years.  Vitals:    03/05/19 1629   Temp: 97.4 °F (36.3 °C)       Physical Exam   Constitutional: Oriented to person, place, and time. Appears well-developed and well-nourished.   HENT:   Head: Normocephalic and atraumatic.   Eyes: Conjunctivae and EOM are normal. Pupils are equal, round, and reactive to light.   Cardiovascular: Normal rate, regular rhythm, normal heart sounds and intact distal pulses.   Pulmonary/Chest: Effort normal and breath sounds normal.   Musculoskeletal:   See detailed exam below   Neurological: Alert and oriented to person, place, and time. No sensory deficit. Coordination normal.   Skin: Skin is warm and dry. Capillary refill takes less than 2 seconds. No rash noted. No erythema.   Psychiatric: Normal mood and affect. Her behavior is normal. Judgment and thought content normal.   Nursing note and vitals reviewed.    Musculoskeletal:    Left knee (varus). Patient has crepitus throughout range of motion. Positive patellar grind test. Mild effusion. Lachman is negative. Pivot shift is negative. Anterior and posterior drawer signs are negative. Significant joint line tenderness is noted on the medial aspect of the knee. Patient has a varus orientation of the knee. There is fullness and tenderness in the Popliteal fossa. Mild distention of a Popliteal cyst is noted in this location. Range of motion in flexion is from 0- 110 degrees. Neurovascular status is intact.  Dorsalis pedis and posterior tibial artery pulses are palpable. Common peroneal nerve function is well preserved. Patient's gait is cautious and " antalgic. Skin and soft tissues are mildly swollen, consistent with synovitis and effusion. The patient has a significant limp with the first few steps after starting the gait cycle. Getting out of a chair takes a lot of effort due to pain on knee flexion.       Diagnostics:      Procedure:  Large Joint Arthrocentesis: L knee  Date/Time: 3/5/2019 4:32 PM  Consent given by: patient  Site marked: site marked  Timeout: Immediately prior to procedure a time out was called to verify the correct patient, procedure, equipment, support staff and site/side marked as required   Supporting Documentation  Indications: pain   Procedure Details  Location: knee - L knee  Preparation: Patient was prepped and draped in the usual sterile fashion  Needle size: 25 G  Approach: anteromedial  Medications administered: 2 mL lidocaine 1 %; 160 mg methylPREDNISolone acetate 80 MG/ML  Patient tolerance: patient tolerated the procedure well with no immediate complications          Assessment:     ICD-10-CM ICD-9-CM   1. Primary osteoarthritis of left knee M17.12 715.16         Plan: Use a supportive brace on the knee to prevent it from buckling and giving her.    She will eventually require knee replacement surgery but at this time we will manage her symptoms non operatively.  · Injected patient's left knee joint(s)with steroid  from an anteromedial approach   · Compression/elastic brace if applicable  · Rest, ice, compression, and elevation (RICE) therapy  · OTC Ibuprofen 600mg by mouth every 6-8 hours as needed for pain and swelling  · Follow up in 3 month(s)      Kamar Alcantar MD

## 2019-03-12 RX ORDER — LIDOCAINE HYDROCHLORIDE 10 MG/ML
2 INJECTION, SOLUTION INFILTRATION; PERINEURAL
Status: COMPLETED | OUTPATIENT
Start: 2019-03-05 | End: 2019-03-05

## 2019-03-12 RX ORDER — METHYLPREDNISOLONE ACETATE 80 MG/ML
160 INJECTION, SUSPENSION INTRA-ARTICULAR; INTRALESIONAL; INTRAMUSCULAR; SOFT TISSUE
Status: COMPLETED | OUTPATIENT
Start: 2019-03-05 | End: 2019-03-05

## 2019-06-04 ENCOUNTER — CLINICAL SUPPORT (OUTPATIENT)
Dept: ORTHOPEDIC SURGERY | Facility: CLINIC | Age: 59
End: 2019-06-04

## 2019-06-04 VITALS — TEMPERATURE: 97.3 F | HEIGHT: 67 IN | WEIGHT: 191 LBS | BODY MASS INDEX: 29.98 KG/M2

## 2019-06-04 DIAGNOSIS — M17.12 PRIMARY OSTEOARTHRITIS OF LEFT KNEE: Primary | ICD-10-CM

## 2019-06-04 PROCEDURE — 20610 DRAIN/INJ JOINT/BURSA W/O US: CPT | Performed by: PHYSICIAN ASSISTANT

## 2019-06-04 RX ADMIN — METHYLPREDNISOLONE ACETATE 160 MG: 80 INJECTION, SUSPENSION INTRA-ARTICULAR; INTRALESIONAL; INTRAMUSCULAR; SOFT TISSUE at 09:51

## 2019-06-04 RX ADMIN — LIDOCAINE HYDROCHLORIDE 2 ML: 10 INJECTION, SOLUTION EPIDURAL; INFILTRATION; INTRACAUDAL; PERINEURAL at 09:51

## 2019-06-04 NOTE — PROGRESS NOTES
INJECTION    Patient: Rain Camarena    YOB: 1960    MRN: [id number]    Chief Complaints: Left knee pain      History of Present Illness: Patient is seen in the office today for left knee pain. The pain is located at the medial aspect of the joint.  It has been progressive in nature but remains intermittent .  Worsened by prolonged standing or walking and squatting activities. Has had improvement in the past with rest, injections.     This problem is not new to this examiner.     Allergies:   Allergies   Allergen Reactions   • Bactine [Lidocaine-Benzalkonium]    • Clindamycin/Lincomycin    • Hydrocodone    • Ibuprofen        Medications:   Home Medications:  Current Outpatient Medications on File Prior to Visit   Medication Sig   • ASCORBIC ACID PO Take 150 mg by mouth.   • B Complex Vitamins (VITAMIN B COMPLEX) capsule capsule Take  by mouth daily.   • baclofen (LIORESAL) 10 MG tablet    • baclofen (LIORESAL) 20 MG tablet    • BIOTIN PO Take 10,000 mcg by mouth daily.   • CALCIUM PO Take 630 mg by mouth.   • Cholecalciferol (VITAMIN D-3 PO) Take 500 Units by mouth.   • cholecalciferol (VITAMIN D3) 400 UNITS tablet Take 400 Units by mouth daily.   • Cyanocobalamin (VITAMIN B12 PO) Take 15 mcg by mouth.   • doxepin (SINEquan) 150 MG capsule    • esomeprazole (nexIUM) 40 MG capsule    • estradiol (MINIVELLE, VIVELLE-DOT) 0.1 MG/24HR patch Place 1 patch on the skin.   • fluticasone (FLONASE) 50 MCG/ACT nasal spray    • FOLIC ACID PO Take 400 mcg by mouth.   • Glucosamine-Chondroit-Vit C-Mn (GLUCOSAMINE 1500 COMPLEX PO) Take  by mouth.   • Glucosamine-Chondroitin (OSTEO BI-FLEX REGULAR STRENGTH PO) Take  by mouth.   • IRON PO Take 65 mg by mouth daily.   • Loratadine 10 MG capsule Take  by mouth daily.   • LUTEIN PO Take 5 mg by mouth.   • Multiple Vitamins-Minerals (OCUVITE ADULT 50+ PO) Take  by mouth daily.   • Multiple Vitamins-Minerals (WOMENS MULTI PO) Take  by mouth.   • NIACIN PO Take 25 mg by  "mouth.   • Omega-3 Fatty Acids (OMEGA-3 PO) Take  by mouth.   • PANTOTHENIC ACID PO Take 5.5 mg by mouth.   • Probiotic Product (PROBIOTIC DAILY PO) Take  by mouth.   • Pyridoxine HCl (VITAMIN B6 PO) Take 2 mg by mouth.   • RESTASIS 0.05 % ophthalmic emulsion    • RIBOFLAVIN PO Take 20 mg by mouth.   • Thiamine Mononitrate 100 MG tablet Take 100 mg by mouth.   • traMADol (ULTRAM) 50 MG tablet    • Zeaxanthin powder      No current facility-administered medications on file prior to visit.      Current Medications:  Scheduled Meds:  PRN Meds:.    I have reviewed the patient's medical history in detail and updated the computerized patient record.  Review and summarization of old records include:    Past Medical History:   Diagnosis Date   • Anemia    • Anxiety    • Depression    • Gastritis    • Hernia    • History of stomach ulcers    • Osteoarthritis      Past Surgical History:   Procedure Laterality Date   • GALLBLADDER SURGERY      2012   • HYSTERECTOMY      2008 LAPAROSCOPIC   • LAPAROSCOPIC GASTRIC BYPASS      2010     Social History     Occupational History   • Not on file   Tobacco Use   • Smoking status: Never Smoker   Substance and Sexual Activity   • Alcohol use: Yes     Comment: ONCE EVERY 2-4 WEEKS   • Drug use: Not on file   • Sexual activity: Not on file      Social History     Social History Narrative   • Not on file     Family History   Problem Relation Age of Onset   • Hypertension Other    • Diabetes Other    • Heart disease Other    • Cancer Other         ANAL       Review of Systems        Wt Readings from Last 3 Encounters:   06/04/19 86.6 kg (191 lb)   03/05/19 85.3 kg (188 lb)   07/26/18 83.9 kg (185 lb)     Ht Readings from Last 3 Encounters:   06/04/19 170.2 cm (67\")   03/05/19 170.2 cm (67\")   07/26/18 175.3 cm (69\")     Body mass index is 29.91 kg/m².  Facility age limit for growth percentiles is 20 years.  Vitals:    06/04/19 0950   Temp: 97.3 °F (36.3 °C)       Physical Exam "   Constitutional: Oriented to person, place, and time. Appears well-developed and well-nourished.   HENT:   Head: Normocephalic and atraumatic.   Eyes: Conjunctivae and EOM are normal. Pupils are equal, round, and reactive to light.   Cardiovascular: Normal rate, regular rhythm, normal heart sounds and intact distal pulses.   Pulmonary/Chest: Effort normal and breath sounds normal.   Musculoskeletal:   See detailed exam below   Neurological: Alert and oriented to person, place, and time. No sensory deficit. Coordination normal.   Skin: Skin is warm and dry. Capillary refill takes less than 2 seconds. No rash noted. No erythema.   Psychiatric: Normal mood and affect. Her behavior is normal. Judgment and thought content normal.   Nursing note and vitals reviewed.    Musculoskeletal:    Left knee (varus). Patient has crepitus throughout range of motion. Positive patellar grind test. Mild effusion. Lachman is negative. Pivot shift is negative. Anterior and posterior drawer signs are negative. Significant joint line tenderness is noted on the medial aspect of the knee. Patient has a varus orientation of the knee. There is fullness and tenderness in the Popliteal fossa. Mild distention of a Popliteal cyst is noted in this location. Range of motion in flexion is from 0-110 degrees. Neurovascular status is intact.  Dorsalis pedis and posterior tibial artery pulses are palpable. Common peroneal nerve function is well preserved. Patient's gait is cautious and antalgic. Skin and soft tissues are mildly swollen, consistent with synovitis and effusion. The patient has a significant limp with the first few steps after starting the gait cycle. Getting out of a chair takes a lot of effort due to pain on knee flexion.       Diagnostics:      Procedure:  Large Joint Arthrocentesis: L knee  Date/Time: 6/4/2019 9:51 AM  Consent given by: patient  Site marked: site marked  Timeout: Immediately prior to procedure a time out was called to  verify the correct patient, procedure, equipment, support staff and site/side marked as required   Supporting Documentation  Indications: pain   Procedure Details  Location: knee - L knee  Preparation: Patient was prepped and draped in the usual sterile fashion  Needle size: 25 G  Approach: anteromedial  Medications administered: 160 mg methylPREDNISolone acetate 80 MG/ML; 2 mL lidocaine PF 1% 1 %  Patient tolerance: patient tolerated the procedure well with no immediate complications          Assessment:   Rain was seen today for follow-up.    Diagnoses and all orders for this visit:    Primary osteoarthritis of left knee  -     Large Joint Arthrocentesis: L knee          Plan:   · Injected patient's left knee joint(s)with steroid  from an anteromedial approach   · Compression/brace   · Rest, ice, compression, and elevation (RICE) therapy  · OTC Alternate Ibuprofen and Tylenol as needed  · Follow up in 3 month(s)      Mamadou Tubbs PA-C  06/04/2019

## 2019-06-05 RX ORDER — METHYLPREDNISOLONE ACETATE 80 MG/ML
160 INJECTION, SUSPENSION INTRA-ARTICULAR; INTRALESIONAL; INTRAMUSCULAR; SOFT TISSUE
Status: COMPLETED | OUTPATIENT
Start: 2019-06-04 | End: 2019-06-04

## 2019-06-05 RX ORDER — LIDOCAINE HYDROCHLORIDE 10 MG/ML
2 INJECTION, SOLUTION EPIDURAL; INFILTRATION; INTRACAUDAL; PERINEURAL
Status: COMPLETED | OUTPATIENT
Start: 2019-06-04 | End: 2019-06-04

## 2019-09-03 ENCOUNTER — CLINICAL SUPPORT (OUTPATIENT)
Dept: ORTHOPEDIC SURGERY | Facility: CLINIC | Age: 59
End: 2019-09-03

## 2019-09-03 VITALS — WEIGHT: 195.6 LBS | BODY MASS INDEX: 29.64 KG/M2 | TEMPERATURE: 97.4 F | HEIGHT: 68 IN

## 2019-09-03 DIAGNOSIS — M17.12 PRIMARY OSTEOARTHRITIS OF LEFT KNEE: Primary | ICD-10-CM

## 2019-09-03 PROCEDURE — 20610 DRAIN/INJ JOINT/BURSA W/O US: CPT | Performed by: ORTHOPAEDIC SURGERY

## 2019-09-03 RX ORDER — LIDOCAINE HYDROCHLORIDE 10 MG/ML
2 INJECTION, SOLUTION EPIDURAL; INFILTRATION; INTRACAUDAL; PERINEURAL
Status: COMPLETED | OUTPATIENT
Start: 2019-09-03 | End: 2019-09-03

## 2019-09-03 RX ORDER — METHYLPREDNISOLONE ACETATE 80 MG/ML
160 INJECTION, SUSPENSION INTRA-ARTICULAR; INTRALESIONAL; INTRAMUSCULAR; SOFT TISSUE
Status: COMPLETED | OUTPATIENT
Start: 2019-09-03 | End: 2019-09-03

## 2019-09-03 RX ADMIN — LIDOCAINE HYDROCHLORIDE 2 ML: 10 INJECTION, SOLUTION EPIDURAL; INFILTRATION; INTRACAUDAL; PERINEURAL at 08:51

## 2019-09-03 RX ADMIN — METHYLPREDNISOLONE ACETATE 160 MG: 80 INJECTION, SUSPENSION INTRA-ARTICULAR; INTRALESIONAL; INTRAMUSCULAR; SOFT TISSUE at 08:51

## 2019-09-03 NOTE — PROGRESS NOTES
INJECTION    Patient: Rain Camarena    YOB: 1960    MRN: [id number]    Chief Complaint   Patient presents with   • Left Knee - Follow-up       History of Present Illness: Ms. Camarena returns today for 3-month follow-up on left knee pain.  Her pain remains at the medial aspect of the left knee joint and is intermittent.  She does report increase in symptoms with prolonged standing or walking but has had an improvement with injections.  She reports she is undergoing epidurals for her neck and low back and eventually will need surgery but is trying to postpone that as long as possible.  In regards to her knee she also knows she will need a total knee replacement in the future but feels she will have her back surgery before the knee.    This problem is not new to this examiner.     Allergies:   Allergies   Allergen Reactions   • Bactine [Lidocaine-Benzalkonium]    • Clindamycin/Lincomycin    • Hydrocodone    • Ibuprofen        Medications:   Home Medications:  Current Outpatient Medications on File Prior to Visit   Medication Sig   • ASCORBIC ACID PO Take 150 mg by mouth.   • B Complex Vitamins (VITAMIN B COMPLEX) capsule capsule Take  by mouth daily.   • baclofen (LIORESAL) 10 MG tablet    • baclofen (LIORESAL) 20 MG tablet    • BIOTIN PO Take 10,000 mcg by mouth daily.   • CALCIUM PO Take 630 mg by mouth.   • Cholecalciferol (VITAMIN D-3 PO) Take 500 Units by mouth.   • cholecalciferol (VITAMIN D3) 400 UNITS tablet Take 400 Units by mouth daily.   • Cyanocobalamin (VITAMIN B12 PO) Take 15 mcg by mouth.   • doxepin (SINEquan) 150 MG capsule    • esomeprazole (nexIUM) 40 MG capsule    • estradiol (MINIVELLE, VIVELLE-DOT) 0.1 MG/24HR patch Place 1 patch on the skin.   • fluticasone (FLONASE) 50 MCG/ACT nasal spray    • FOLIC ACID PO Take 400 mcg by mouth.   • Glucosamine-Chondroit-Vit C-Mn (GLUCOSAMINE 1500 COMPLEX PO) Take  by mouth.   • Glucosamine-Chondroitin (OSTEO BI-FLEX REGULAR STRENGTH PO) Take   by mouth.   • IRON PO Take 65 mg by mouth daily.   • Loratadine 10 MG capsule Take  by mouth daily.   • LUTEIN PO Take 5 mg by mouth.   • Multiple Vitamins-Minerals (OCUVITE ADULT 50+ PO) Take  by mouth daily.   • Multiple Vitamins-Minerals (WOMENS MULTI PO) Take  by mouth.   • NIACIN PO Take 25 mg by mouth.   • Omega-3 Fatty Acids (OMEGA-3 PO) Take  by mouth.   • PANTOTHENIC ACID PO Take 5.5 mg by mouth.   • Probiotic Product (PROBIOTIC DAILY PO) Take  by mouth.   • Pyridoxine HCl (VITAMIN B6 PO) Take 2 mg by mouth.   • RESTASIS 0.05 % ophthalmic emulsion    • RIBOFLAVIN PO Take 20 mg by mouth.   • Thiamine Mononitrate 100 MG tablet Take 100 mg by mouth.   • traMADol (ULTRAM) 50 MG tablet    • Zeaxanthin powder      No current facility-administered medications on file prior to visit.      Current Medications:  Scheduled Meds:  PRN Meds:.    I have reviewed the patient's medical history in detail and updated the computerized patient record.  Review and summarization of old records include:    Past Medical History:   Diagnosis Date   • Anemia    • Anxiety    • Depression    • Gastritis    • Hernia    • History of stomach ulcers    • Osteoarthritis      Past Surgical History:   Procedure Laterality Date   • GALLBLADDER SURGERY      2012   • HYSTERECTOMY      2008 LAPAROSCOPIC   • LAPAROSCOPIC GASTRIC BYPASS      2010     Social History     Occupational History   • Not on file   Tobacco Use   • Smoking status: Never Smoker   Substance and Sexual Activity   • Alcohol use: Yes     Comment: ONCE EVERY 2-4 WEEKS   • Drug use: Not on file   • Sexual activity: Not on file      Social History     Social History Narrative   • Not on file     Family History   Problem Relation Age of Onset   • Hypertension Other    • Diabetes Other    • Heart disease Other    • Cancer Other         ANAL       Review of Systems        Wt Readings from Last 3 Encounters:   09/03/19 88.7 kg (195 lb 9.6 oz)   06/04/19 86.6 kg (191 lb)   03/05/19  "85.3 kg (188 lb)     Ht Readings from Last 3 Encounters:   09/03/19 172.7 cm (68\")   06/04/19 170.2 cm (67\")   03/05/19 170.2 cm (67\")     Body mass index is 29.74 kg/m².  Facility age limit for growth percentiles is 20 years.  Vitals:    09/03/19 0848   Temp: 97.4 °F (36.3 °C)       Physical Exam  Constitutional: Patient is oriented to person, place, and time. Appears well-developed and well-nourished.   HENT:   Head: Normocephalic and atraumatic.   Eyes: Conjunctivae and EOM are normal. Pupils are equal, round, and reactive to light.   Cardiovascular: Normal rate and intact distal pulses.   Pulmonary/Chest: Effort normal and breath sounds normal.   Musculoskeletal:   See detailed exam below   Neurological: Alert and oriented to person, place, and time. No sensory deficit. Coordination normal.   Skin: Skin is warm and dry. Capillary refill takes less than 2 seconds. No rash noted. No erythema.   Psychiatric: Patient has a normal mood and affect. Her behavior is normal. Judgment and thought content normal.   Nursing note and vitals reviewed.      Musculoskeletal:    Affected Knee(s): Left knee  Patient has crepitus throughout range of motion.   Mild effusion.   Mild joint line tenderness is noted on the medial aspect of the knee(s).   Patient has a varus orientation of the knee(s).   There is fullness and tenderness in the popliteal fossa.   Range of motion: Flexion 0- 110 degrees.   Neurovascular status is intact.  Dorsalis pedis and posterior tibial artery pulses are palpable. Common peroneal nerve function is well preserved.   Patient's gait is cautious and antalgic. Skin and soft tissues are mildly swollen, consistent with synovitis and effusion.   Special Testing:  Lachman negative,   Anterior drawernegative,   Posterior drawer negative,   Pavan's negative,   Patellofemoral grind positive,   Pivot shift is negative.        Diagnostics:  No diagnostic testing performed today    Procedure:  Large Joint " Arthrocentesis: L knee  Date/Time: 9/3/2019 8:51 AM  Consent given by: patient  Site marked: site marked  Timeout: Immediately prior to procedure a time out was called to verify the correct patient, procedure, equipment, support staff and site/side marked as required   Supporting Documentation  Indications: pain   Procedure Details  Location: knee - L knee  Preparation: Patient was prepped and draped in the usual sterile fashion  Needle size: 25 G  Approach: anteromedial  Medications administered: 160 mg methylPREDNISolone acetate 80 MG/ML; 2 mL lidocaine PF 1% 1 %  Patient tolerance: patient tolerated the procedure well with no immediate complications          Assessment:   Rain was seen today for follow-up.    Diagnoses and all orders for this visit:    Primary osteoarthritis of left knee  -     Large Joint Arthrocentesis: L knee          Plan:   · Injected patient's left knee joint(s)with Steroid from an anteromedial approach   · Discussed medication options for pain relief but given her history of gastric surgery she is limited on what she can take.  He is currently using Voltaren gel.  · Compression/brace   · Rest, ice, compression, and elevation (RICE) therapy  · OTC Tylenol 500-1000mg by mouth every 6 hours as needed for pain   · Follow up in 3 month(s)    Date of encounter: 09/03/2019   Mamadou Tubbs PA-C

## 2019-12-03 ENCOUNTER — CLINICAL SUPPORT (OUTPATIENT)
Dept: ORTHOPEDIC SURGERY | Facility: CLINIC | Age: 59
End: 2019-12-03

## 2019-12-03 VITALS — TEMPERATURE: 97.4 F | HEIGHT: 67 IN | BODY MASS INDEX: 31.04 KG/M2 | WEIGHT: 197.8 LBS

## 2019-12-03 DIAGNOSIS — M17.12 PRIMARY OSTEOARTHRITIS OF LEFT KNEE: Primary | ICD-10-CM

## 2019-12-03 PROCEDURE — 20610 DRAIN/INJ JOINT/BURSA W/O US: CPT | Performed by: PHYSICIAN ASSISTANT

## 2019-12-03 RX ORDER — ESTRADIOL 0.05 MG/D
FILM, EXTENDED RELEASE TRANSDERMAL
COMMUNITY
Start: 2019-10-19

## 2019-12-03 RX ORDER — GABAPENTIN 300 MG/1
CAPSULE ORAL
COMMUNITY
Start: 2019-11-26

## 2019-12-03 RX ORDER — METAXALONE 800 MG/1
800 TABLET ORAL 3 TIMES DAILY
COMMUNITY
End: 2023-03-13

## 2019-12-03 RX ORDER — TRIAMCINOLONE ACETONIDE 55 UG/1
1-2 SPRAY, METERED NASAL DAILY
COMMUNITY
End: 2023-03-13

## 2019-12-03 RX ORDER — BACLOFEN 10 MG/1
TABLET ORAL
COMMUNITY

## 2019-12-03 RX ADMIN — METHYLPREDNISOLONE ACETATE 160 MG: 80 INJECTION, SUSPENSION INTRA-ARTICULAR; INTRALESIONAL; INTRAMUSCULAR; SOFT TISSUE at 14:46

## 2019-12-03 RX ADMIN — LIDOCAINE HYDROCHLORIDE 2 ML: 10 INJECTION, SOLUTION INFILTRATION; PERINEURAL at 14:46

## 2019-12-03 NOTE — PROGRESS NOTES
Large Joint Arthrocentesis: L knee  Date/Time: 12/3/2019 2:46 PM  Consent given by: patient  Site marked: site marked  Timeout: Immediately prior to procedure a time out was called to verify the correct patient, procedure, equipment, support staff and site/side marked as required   Supporting Documentation  Indications: pain   Procedure Details  Location: knee - L knee  Preparation: Patient was prepped and draped in the usual sterile fashion  Needle size: 25 G  Approach: anteromedial  Medications administered: 2 mL lidocaine 1 %; 160 mg methylPREDNISolone acetate 80 MG/ML  Patient tolerance: patient tolerated the procedure well with no immediate complications      Electronically signed by Mamadou Tubbs PA-C, 12/05/19, 7:45 PM.

## 2019-12-05 RX ORDER — METHYLPREDNISOLONE ACETATE 80 MG/ML
160 INJECTION, SUSPENSION INTRA-ARTICULAR; INTRALESIONAL; INTRAMUSCULAR; SOFT TISSUE
Status: COMPLETED | OUTPATIENT
Start: 2019-12-03 | End: 2019-12-03

## 2019-12-05 RX ORDER — LIDOCAINE HYDROCHLORIDE 10 MG/ML
2 INJECTION, SOLUTION INFILTRATION; PERINEURAL
Status: COMPLETED | OUTPATIENT
Start: 2019-12-03 | End: 2019-12-03

## 2019-12-06 NOTE — PROGRESS NOTES
INJECTION    Patient: Rain Camarena    YOB: 1960    MRN: [id number]    Chief Complaint   Patient presents with   • Left Knee - Follow-up, Pain, Injections       History of Present Illness: Patient is seen in the office today for left knee pain. The pain is located at the medial aspect of the joint.  It has been progressive in nature but remains intermittent .  Worsened by prolonged standing or walking and squatting activities. Has had improvement in the past with rest and injections.     This problem is not new to this examiner.     Allergies:   Allergies   Allergen Reactions   • Bactine [Lidocaine-Benzalkonium]    • Clindamycin/Lincomycin    • Hydrocodone    • Ibuprofen        Medications:   Home Medications:  Current Outpatient Medications on File Prior to Visit   Medication Sig   • ASCORBIC ACID PO Take 150 mg by mouth.   • B Complex Vitamins (VITAMIN B COMPLEX) capsule capsule Take  by mouth daily.   • baclofen (LIORESAL) 10 MG tablet Take  by mouth.   • BIOTIN PO Take 10,000 mcg by mouth daily.   • CALCIUM PO Take 630 mg by mouth.   • Cholecalciferol (VITAMIN D-3 PO) Take 500 Units by mouth.   • cholecalciferol (VITAMIN D3) 400 UNITS tablet Take 400 Units by mouth daily.   • Cyanocobalamin (VITAMIN B12 PO) Take 15 mcg by mouth.   • doxepin (SINEquan) 150 MG capsule    • esomeprazole (nexIUM) 40 MG capsule    • estradiol (MINIVELLE, VIVELLE-DOT) 0.05 MG/24HR patch    • estradiol (MINIVELLE, VIVELLE-DOT) 0.1 MG/24HR patch Place 1 patch on the skin.   • fluticasone (FLONASE) 50 MCG/ACT nasal spray    • FOLIC ACID PO Take 400 mcg by mouth.   • gabapentin (NEURONTIN) 300 MG capsule    • Glucosamine-Chondroit-Vit C-Mn (GLUCOSAMINE 1500 COMPLEX PO) Take  by mouth.   • Glucosamine-Chondroitin (OSTEO BI-FLEX REGULAR STRENGTH PO) Take  by mouth.   • IRON PO Take 65 mg by mouth daily.   • Lifitegrast 5 % solution Apply  to eye(s) as directed by provider.   • Loratadine 10 MG capsule Take  by mouth daily.    • LUTEIN PO Take 5 mg by mouth.   • metaxalone (SKELAXIN) 800 MG tablet Take 800 mg by mouth 3 (Three) Times a Day.   • Multiple Vitamins-Minerals (OCUVITE ADULT 50+ PO) Take  by mouth daily.   • Multiple Vitamins-Minerals (WOMENS MULTI PO) Take  by mouth.   • NIACIN PO Take 25 mg by mouth.   • Omega-3 Fatty Acids (OMEGA-3 PO) Take  by mouth.   • PANTOTHENIC ACID PO Take 5.5 mg by mouth.   • Probiotic Product (PROBIOTIC DAILY PO) Take  by mouth.   • Pyridoxine HCl (VITAMIN B6 PO) Take 2 mg by mouth.   • RESTASIS 0.05 % ophthalmic emulsion    • RIBOFLAVIN PO Take 20 mg by mouth.   • Thiamine Mononitrate 100 MG tablet Take 100 mg by mouth.   • traMADol (ULTRAM) 50 MG tablet    • Triamcinolone Acetonide (NASACORT) 55 MCG/ACT nasal inhaler 1-2 sprays into the nostril(s) as directed by provider Daily.   • Zeaxanthin powder      No current facility-administered medications on file prior to visit.      Current Medications:  Scheduled Meds:  PRN Meds:.    I have reviewed the patient's medical history in detail and updated the computerized patient record.  Review and summarization of old records include:    Past Medical History:   Diagnosis Date   • Anemia    • Anxiety    • Depression    • Gastritis    • Hernia    • History of stomach ulcers    • Osteoarthritis      Past Surgical History:   Procedure Laterality Date   • GALLBLADDER SURGERY      2012   • HYSTERECTOMY      2008 LAPAROSCOPIC   • LAPAROSCOPIC GASTRIC BYPASS      2010     Social History     Occupational History   • Not on file   Tobacco Use   • Smoking status: Never Smoker   Substance and Sexual Activity   • Alcohol use: Yes     Comment: ONCE EVERY 2-4 WEEKS   • Drug use: Not on file   • Sexual activity: Not on file      Social History     Social History Narrative   • Not on file     Family History   Problem Relation Age of Onset   • Hypertension Other    • Diabetes Other    • Heart disease Other    • Cancer Other         ANAL       Review of  "Systems:  Constitutional: Negative.    Eyes: Negative.    Respiratory: Negative.    Cardiovascular: Negative.    Endocrine: Negative.    Musculoskeletal: Positive for arthralgias and joint swelling.   Skin: Negative.    Allergic/Immunologic: Negative.    Neurological: Negative.    Hematological: Negative.    Psychiatric/Behavioral: Negative.            Wt Readings from Last 3 Encounters:   12/03/19 89.7 kg (197 lb 12.8 oz)   09/03/19 88.7 kg (195 lb 9.6 oz)   06/04/19 86.6 kg (191 lb)     Ht Readings from Last 3 Encounters:   12/03/19 170.2 cm (67\")   09/03/19 172.7 cm (68\")   06/04/19 170.2 cm (67\")     Body mass index is 30.98 kg/m².  Facility age limit for growth percentiles is 20 years.  Vitals:    12/03/19 1449   Temp: 97.4 °F (36.3 °C)       Physical Exam:  Constitutional: Patient is oriented to person, place, and time. Appears well-developed and well-nourished.   HENT:   Head: Normocephalic and atraumatic.   Eyes: Conjunctivae and EOM are normal. Pupils are equal, round, and reactive to light.   Cardiovascular: Normal rate and intact distal pulses.   Pulmonary/Chest: Effort normal and breath sounds normal.   Musculoskeletal:   See detailed exam below   Neurological: Alert and oriented to person, place, and time. No sensory deficit. Coordination normal.   Skin: Skin is warm and dry. Capillary refill takes less than 2 seconds. No rash noted. No erythema.   Psychiatric: Patient has a normal mood and affect. Her behavior is normal. Judgment and thought content normal.   Nursing note and vitals reviewed.      Musculoskeletal Exam:    Affected Knee(s): Left knee  Patient has crepitus throughout range of motion.   Mild effusion.   Moderate joint line tenderness at the medial aspect of the knee.   Patient has a varus orientation of the knee.   There is mild fullness in the popliteal fossa.   Range of motion: Flexion 0- 110 degrees.   Neurovascular status is intact.  Dorsalis pedis and posterior tibial artery pulses " are palpable. Common peroneal nerve function is well preserved.   Patient's gait is cautious and antalgic.   Special Testing:  Patellofemoral grind positive.      Diagnostics:  no diagnostic testing performed this visit    Procedure:  See separate procedure note    Assessment:   Rain was seen today for follow-up, pain and injections.    Diagnoses and all orders for this visit:    Primary osteoarthritis of left knee  -     Large Joint Arthrocentesis: L knee          Plan:   · Injected patient's left knee joint with steroid from an anteromedial approach   · Compression/brace   · Rest, ice, compression, and elevation (RICE) therapy  · OTC Alternate Ibuprofen and Tylenol as needed  · Follow up in 3 month(s)    Date of encounter: 12/03/2019   Mamadou Tubbs PA-C    Electronically signed by Mamadou uTbbs PA-C, 12/05/19, 7:45 PM.

## 2020-03-03 ENCOUNTER — CLINICAL SUPPORT (OUTPATIENT)
Dept: ORTHOPEDIC SURGERY | Facility: CLINIC | Age: 60
End: 2020-03-03

## 2020-03-03 VITALS — TEMPERATURE: 98.5 F | BODY MASS INDEX: 29.92 KG/M2 | HEIGHT: 69 IN | WEIGHT: 202 LBS

## 2020-03-03 DIAGNOSIS — M17.12 OSTEOARTHRITIS OF LEFT KNEE, UNSPECIFIED OSTEOARTHRITIS TYPE: Primary | ICD-10-CM

## 2020-03-03 PROBLEM — M43.16 SPONDYLOLISTHESIS OF LUMBAR REGION: Status: ACTIVE | Noted: 2019-03-07

## 2020-03-03 PROCEDURE — 20610 DRAIN/INJ JOINT/BURSA W/O US: CPT | Performed by: PHYSICIAN ASSISTANT

## 2020-03-03 RX ORDER — LIDOCAINE HYDROCHLORIDE 10 MG/ML
2 INJECTION, SOLUTION EPIDURAL; INFILTRATION; INTRACAUDAL; PERINEURAL
Status: COMPLETED | OUTPATIENT
Start: 2020-03-03 | End: 2020-03-03

## 2020-03-03 RX ORDER — METHYLPREDNISOLONE ACETATE 80 MG/ML
160 INJECTION, SUSPENSION INTRA-ARTICULAR; INTRALESIONAL; INTRAMUSCULAR; SOFT TISSUE
Status: COMPLETED | OUTPATIENT
Start: 2020-03-03 | End: 2020-03-03

## 2020-03-03 RX ADMIN — METHYLPREDNISOLONE ACETATE 160 MG: 80 INJECTION, SUSPENSION INTRA-ARTICULAR; INTRALESIONAL; INTRAMUSCULAR; SOFT TISSUE at 14:39

## 2020-03-03 RX ADMIN — LIDOCAINE HYDROCHLORIDE 2 ML: 10 INJECTION, SOLUTION EPIDURAL; INFILTRATION; INTRACAUDAL; PERINEURAL at 14:39

## 2020-03-03 NOTE — PROGRESS NOTES
INJECTION      Patient: Rain Camarena    MRN: [id number]    YOB: 1960    Chief Complaint   Patient presents with   • Left Knee - Follow-up, Pain   • Injections         History of Present Illness:  Rain Camarena is here today for injection therapy. She receives left knee injections. Since last injection, patient notes substantial relief of symptoms. No adverse effects of prior injection. Options have been discussed and she understands and consents.       Physical Exam:   59 y.o. female awake, alert, oriented, in no acute distress and well developed, well nourished.  There is mild joint line tenderness at the medial aspect of the knee. The knee has a varus orientation.   Positive for crepitus throughout range of motion.   Positive for mild effusion.  Findings are consistent with synovitis and effusion.    Positive patellar grind test.   Negative Lachman test.    Negative anterior and posterior drawer.  Range of motion in extension and flexion is: 0-110 degrees.  Neurovascular status is intact.  Dorsalis pedis and posterior tibial artery pulses are palpable. Common peroneal nerve function is well preserved.   Gait is cautious and antalgic.      Procedure:  See separate procedure note     Injection site was identified by physical examination and cleaned with Betadine and alcohol swabs. Prior to needle insertion, ethyl chloride spray was used for surface anesthesia. Sterile technique was used.       ASSESSMENT:  Rain was seen today for injections, follow-up and pain.    Diagnoses and all orders for this visit:    Osteoarthritis of left knee, unspecified osteoarthritis type  -     Large Joint Arthrocentesis: L knee          PLAN:   • Left knee steroid injection was discussed with the patient. Discussed indication, risks, benefits, and alternatives. Verbal consent was given to proceed with the procedure.   • Injection was performed from anteromedial approach.  Patient tolerated the procedure well and no  complications were encountered.  • Discussion of orthopedic goals and activities and patient/guardian expressed understanding.  • Ice, heat, rest, compression and elevation of extremity as beneficial  • nsaids and/or tylenol as beneficial  • Instructed to refrain from heavy activity/rest the extremity for the next 24-48 hours  • Discussion regarding possibility of cortisol flare and what to expect if this occurs  • Watch for signs and symptoms of infection  • Call if adverse effect from injection therapy  • Follow up in 3 months      Mamadou Tubbs PA-C  Encounter Date: 3/3/2020    Electronically signed by Mamadou Tubbs PA-C, 03/03/20, 3:58 PM.      EMR Dragon/Transcription disclaimer:  Much of this encounter note is an electronic transcription/translation of spoken language to printed text. The electronic translation of spoken language may permit erroneous, or at times, nonsensical words or phrases to be inadvertently transcribed; Although I have reviewed the note for such errors, some may still exist.

## 2020-03-03 NOTE — PROGRESS NOTES
Procedure   Large Joint Arthrocentesis: L knee  Date/Time: 3/3/2020 2:39 PM  Consent given by: patient  Site marked: site marked  Timeout: Immediately prior to procedure a time out was called to verify the correct patient, procedure, equipment, support staff and site/side marked as required   Supporting Documentation  Indications: pain and joint swelling   Procedure Details  Location: knee - L knee  Preparation: Patient was prepped and draped in the usual sterile fashion  Needle size: 25 G  Approach: anteromedial  Medications administered: 160 mg methylPREDNISolone acetate 80 MG/ML; 2 mL lidocaine PF 1% 1 %  Patient tolerance: patient tolerated the procedure well with no immediate complications      Electronically signed by Mamadou Tubbs PA-C, 03/03/20, 3:57 PM.

## 2020-06-02 ENCOUNTER — CLINICAL SUPPORT (OUTPATIENT)
Dept: ORTHOPEDIC SURGERY | Facility: CLINIC | Age: 60
End: 2020-06-02

## 2020-06-02 VITALS — TEMPERATURE: 98 F | HEIGHT: 69 IN | BODY MASS INDEX: 28.88 KG/M2 | WEIGHT: 195 LBS

## 2020-06-02 DIAGNOSIS — M17.12 PRIMARY OSTEOARTHRITIS OF LEFT KNEE: Primary | ICD-10-CM

## 2020-06-02 PROCEDURE — 20610 DRAIN/INJ JOINT/BURSA W/O US: CPT | Performed by: PHYSICIAN ASSISTANT

## 2020-06-02 RX ORDER — BROMPHENIRAMINE MALEATE, PSEUDOEPHEDRINE HYDROCHLORIDE, AND DEXTROMETHORPHAN HYDROBROMIDE 2; 30; 10 MG/5ML; MG/5ML; MG/5ML
SYRUP ORAL
COMMUNITY
Start: 2020-03-16 | End: 2023-03-13

## 2020-06-02 RX ORDER — CEFDINIR 300 MG/1
CAPSULE ORAL
COMMUNITY
Start: 2020-03-16 | End: 2023-03-13

## 2020-06-02 RX ORDER — ALBUTEROL SULFATE 90 UG/1
AEROSOL, METERED RESPIRATORY (INHALATION)
COMMUNITY
Start: 2020-03-16 | End: 2023-03-13

## 2020-06-02 RX ADMIN — METHYLPREDNISOLONE ACETATE 160 MG: 80 INJECTION, SUSPENSION INTRA-ARTICULAR; INTRALESIONAL; INTRAMUSCULAR; SOFT TISSUE at 13:06

## 2020-06-02 RX ADMIN — LIDOCAINE HYDROCHLORIDE 2 ML: 10 INJECTION, SOLUTION EPIDURAL; INFILTRATION; INTRACAUDAL; PERINEURAL at 13:06

## 2020-06-02 NOTE — PROGRESS NOTES
NJECTION      Patient: Rain Camarena    MRN: [id number]    YOB: 1960      History of Present Illness:  Rain Camarena is here today for injection therapy. She receives left knee injections of steroid. She  reports Since last injection, patient notes moderate to substantial relief of symptoms. She reports increased difficulty with walking up and down stairs. Treatment options have been discussed and she understands and consents.       Physical Exam:   59 y.o. female awake, alert, oriented, in no acute distress and well developed, well nourished.  There is mild joint line tenderness at the medial aspect of the knee. The knee has a varus orientation.   Positive for crepitus throughout range of motion.   Positive for mild effusion.   Positive patellar grind test.   Negative Lachman test.    Negative anterior and posterior drawer.  Range of motion in extension and flexion is: 0-110 degrees.  Neurovascular status is intact.  Dorsalis pedis and posterior tibial artery pulses are palpable. Common peroneal nerve function is well preserved.   Gait is cautious and antalgic.      Procedures  See separate procedure note  Injection site was identified by physical examination and cleaned with Betadine and alcohol swabs. Prior to needle insertion, ethyl chloride spray was used for surface anesthesia. Sterile technique was used.       ASSESSMENT:  Rain was seen today for injections, pain and follow-up.    Diagnoses and all orders for this visit:    Primary osteoarthritis of left knee  -     Large Joint Arthrocentesis: L knee          PLAN:   • Left knee steroid injection was discussed with the patient. Discussed indication, risks, benefits, and alternatives. Verbal consent was given to proceed with the procedure.   • Injection was performed from anteromedial approach.  Patient tolerated the procedure well and no complications were encountered.  • Discussion of orthopedic goals and activities and patient/guardian  expressed understanding.  • Ice, heat, rest, compression and elevation of extremity as beneficial  • nsaids and/or tylenol as beneficial  • Instructed to refrain from heavy activity/rest the extremity for the next 24-48 hours  • Discussion regarding possibility of cortisol flare and what to expect if this occurs  • Watch for signs and symptoms of infection  • Call if adverse effect from injection therapy  • Follow up in 3 months       Mamadou Tubbs PA-C  Encounter Date: 6/2/2020    Electronically signed by Mamadou Tubbs PA-C, 06/02/20, 1:08 PM.      EMR Dragon/Transcription disclaimer:  Much of this encounter note is an electronic transcription/translation of spoken language to printed text. The electronic translation of spoken language may permit erroneous, or at times, nonsensical words or phrases to be inadvertently transcribed; Although I have reviewed the note for such errors, some may still exist.

## 2020-06-02 NOTE — PROGRESS NOTES
Procedure   Large Joint Arthrocentesis: L knee  Date/Time: 6/2/2020 1:06 PM  Consent given by: patient  Site marked: site marked  Timeout: Immediately prior to procedure a time out was called to verify the correct patient, procedure, equipment, support staff and site/side marked as required   Supporting Documentation  Indications: pain and joint swelling   Procedure Details  Location: knee - L knee  Preparation: Patient was prepped and draped in the usual sterile fashion  Needle size: 25 G  Approach: anteromedial  Medications administered: 2 mL lidocaine PF 1% 1 %; 160 mg methylPREDNISolone acetate 80 MG/ML  Patient tolerance: patient tolerated the procedure well with no immediate complications      Electronically signed by Mamadou Tubbs PA-C, 06/02/20, 1:07 PM.

## 2020-06-10 RX ORDER — METHYLPREDNISOLONE ACETATE 80 MG/ML
160 INJECTION, SUSPENSION INTRA-ARTICULAR; INTRALESIONAL; INTRAMUSCULAR; SOFT TISSUE
Status: COMPLETED | OUTPATIENT
Start: 2020-06-02 | End: 2020-06-02

## 2020-06-10 RX ORDER — LIDOCAINE HYDROCHLORIDE 10 MG/ML
2 INJECTION, SOLUTION EPIDURAL; INFILTRATION; INTRACAUDAL; PERINEURAL
Status: COMPLETED | OUTPATIENT
Start: 2020-06-02 | End: 2020-06-02

## 2020-09-22 ENCOUNTER — CLINICAL SUPPORT (OUTPATIENT)
Dept: ORTHOPEDIC SURGERY | Facility: CLINIC | Age: 60
End: 2020-09-22

## 2020-09-22 ENCOUNTER — HOSPITAL ENCOUNTER (OUTPATIENT)
Dept: OTHER | Facility: HOSPITAL | Age: 60
Discharge: HOME | End: 2020-09-22
Attending: PHYSICIAN ASSISTANT

## 2020-09-22 VITALS — WEIGHT: 209 LBS | BODY MASS INDEX: 32.8 KG/M2 | TEMPERATURE: 97.6 F | HEIGHT: 67 IN

## 2020-09-22 DIAGNOSIS — M79.671 RIGHT FOOT PAIN: Primary | ICD-10-CM

## 2020-09-22 DIAGNOSIS — M79.672 LEFT FOOT PAIN: ICD-10-CM

## 2020-09-22 DIAGNOSIS — M25.561 ACUTE PAIN OF RIGHT KNEE: ICD-10-CM
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
## 2020-09-22 DIAGNOSIS — M25.571 ACUTE RIGHT ANKLE PAIN: ICD-10-CM

## 2020-09-22 DIAGNOSIS — M17.12 PRIMARY OSTEOARTHRITIS OF LEFT KNEE: Primary | ICD-10-CM

## 2020-09-22 PROCEDURE — 20610 DRAIN/INJ JOINT/BURSA W/O US: CPT | Performed by: PHYSICIAN ASSISTANT

## 2020-09-22 PROCEDURE — 99214 OFFICE O/P EST MOD 30 MIN: CPT | Performed by: PHYSICIAN ASSISTANT

## 2020-09-22 RX ORDER — ESTRADIOL 0.04 MG/D
FILM, EXTENDED RELEASE TRANSDERMAL
COMMUNITY
Start: 2020-09-15 | End: 2023-03-13

## 2020-09-22 RX ADMIN — LIDOCAINE HYDROCHLORIDE 2 ML: 10 INJECTION, SOLUTION EPIDURAL; INFILTRATION; INTRACAUDAL; PERINEURAL at 10:43

## 2020-09-22 RX ADMIN — METHYLPREDNISOLONE ACETATE 160 MG: 80 INJECTION, SUSPENSION INTRA-ARTICULAR; INTRALESIONAL; INTRAMUSCULAR; SOFT TISSUE at 10:43

## 2020-09-22 NOTE — PROGRESS NOTES
Large Joint Arthrocentesis: L knee  Date/Time: 9/22/2020 10:43 AM  Consent given by: patient  Site marked: site marked  Timeout: Immediately prior to procedure a time out was called to verify the correct patient, procedure, equipment, support staff and site/side marked as required   Supporting Documentation  Indications: pain   Procedure Details  Location: knee - L knee  Preparation: Patient was prepped and draped in the usual sterile fashion  Needle size: 25 G  Approach: anteromedial  Medications administered: 2 mL lidocaine PF 1% 1 %; 160 mg methylPREDNISolone acetate 80 MG/ML  Patient tolerance: patient tolerated the procedure well with no immediate complications      Electronically signed by Mamadou Tubbs PA-C, 09/26/20, 11:33 PM EDT.

## 2020-09-24 ENCOUNTER — TELEPHONE (OUTPATIENT)
Dept: ORTHOPEDIC SURGERY | Facility: CLINIC | Age: 60
End: 2020-09-24

## 2020-09-24 NOTE — TELEPHONE ENCOUNTER
PT CALLED FOR HER XRAY RESULTS SINCE SHE WENT DOWN AFTER HER APPOINTMENT EARLIER THIS WEEK    SHE ALSO STATED SHE HADNT HEARD ABOUT MRI YET (SHE LEFT THIS ON VOICEMAIL AND I KNOW IT IS TOO SOON FOR THIS TO BE COMPLETED YET)
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
## 2020-09-26 PROBLEM — M79.672 LEFT FOOT PAIN: Status: ACTIVE | Noted: 2020-09-26

## 2020-09-26 PROBLEM — M25.561 ACUTE PAIN OF RIGHT KNEE: Status: ACTIVE | Noted: 2020-09-26

## 2020-09-26 PROBLEM — M25.571 ACUTE RIGHT ANKLE PAIN: Status: ACTIVE | Noted: 2020-09-26

## 2020-09-26 RX ORDER — LIDOCAINE HYDROCHLORIDE 10 MG/ML
2 INJECTION, SOLUTION EPIDURAL; INFILTRATION; INTRACAUDAL; PERINEURAL
Status: COMPLETED | OUTPATIENT
Start: 2020-09-22 | End: 2020-09-22

## 2020-09-26 RX ORDER — METHYLPREDNISOLONE ACETATE 80 MG/ML
160 INJECTION, SUSPENSION INTRA-ARTICULAR; INTRALESIONAL; INTRAMUSCULAR; SOFT TISSUE
Status: COMPLETED | OUTPATIENT
Start: 2020-09-22 | End: 2020-09-22

## 2020-09-27 NOTE — TELEPHONE ENCOUNTER
Note has been completed for MRI.    Could you let her know there was no acute bony abnormality is noted on foot or ankle films

## 2020-09-27 NOTE — PROGRESS NOTES
INJECTION/FOLLOW UP      Patient: Rain Camarena    MRN: [id number]    YOB: 1960    Chief Complaint   Patient presents with   • Left Knee - Follow-up, Pain   • Right Knee - Pain, Knee Injury         History of Present Illness:  Rain Camarena is here today for follow-up on left knee.  She has been receiving intra-articular steroid injections at regular intervals.  She also presents for pain in the left knee after she fell after sliding out of her flip flop and twisted the ankle.  She also reports pain in the right ankle.  Right ankle pain is present at the medial aspect of the ankle with palpation.  She reports pain as burning that is intermittent and made worse with walking.  She was seen in the emergency due to significant knee pain and xrays were obtained.      Pain Location:  RIGHT knee  Radiation: Medial knee  Quality: burning  Intensity/Severity: moderate  Duration: since 9/8/20  Progression of symptoms: no worsening, symptoms stable/unchanged  Onset quality: sudden  Timing: intermittent  Aggravating Factors: walking, standing  Alleviating Factors: Tylenol, rest, heat, ice  Previous Episodes: none mentioned  Associated Symptoms: pain, swelling, clicking/popping  ADLs Affected: grooming/hygiene/toileting/personal care, ambulating, work related activities, recreational activities/sports      Past Medical History:   Diagnosis Date   • Anemia    • Anxiety    • Depression    • Gastritis    • Hernia    • History of stomach ulcers    • Osteoarthritis       Social History     Tobacco Use   • Smoking status: Never Smoker   • Smokeless tobacco: Never Used   Substance Use Topics   • Alcohol use: Yes     Comment: ONCE EVERY 2-4 WEEKS   • Drug use: Not on file       Family History   Problem Relation Age of Onset   • Hypertension Other    • Diabetes Other    • Heart disease Other    • Cancer Other         ANAL          Review of Systems   Constitutional: Negative.  Negative for fever.   Eyes: Negative.   "  Respiratory: Negative.    Cardiovascular: Negative.    Endocrine: Negative.    Musculoskeletal: Positive for arthralgias, gait problem and joint swelling.   Skin: Negative.  Negative for rash and wound.   Allergic/Immunologic: Negative.    Neurological: Negative for numbness.   Hematological: Negative.    Psychiatric/Behavioral: Negative.      Vitals:    09/22/20 1041   Temp: 97.6 °F (36.4 °C)   Weight: 94.8 kg (209 lb)   Height: 170.2 cm (67\")     Body mass index is 32.73 kg/m².       Physical Exam:   59 y.o. female awake, alert, oriented, in no acute distress and well developed, well nourished.  LEFT knee  There is moderate joint line tenderness at the medial aspect of the knee. The knee has a varus orientation.   Positive for crepitus throughout range of motion.   Negative for effusion.  Positive patellar grind test.   Negative Lachman test.    Negative anterior and posterior drawer.  Range of motion in extension and flexion is: 0-110 degrees.  Neurovascular status is intact.  Dorsalis pedis and posterior tibial artery pulses are palpable. Common peroneal nerve function is well preserved.   Gait is cautious and antalgic.       RIGHT knee  Positive for effusion of the knee joint and tenderness with palpation of the medial meniscus.        Positive for tenderness over the medial face of the tibia just distal to the joint line.  Range of motion-extension to flexion: 0-110 degrees.  Positive Apley's grinding test over the joint line.   Positive Pavan's.   Negative for instability on medial or lateral testing.   Anterior and posterior drawer testing is negative.   Lachman test is negative.  The dorsalis pedis and posterior tibial artery pulses are palpable. Common peroneal nerve function is well preserved.  Gait is cautious and somewhat antalgic.     RIGHT ankle  Positive for moderate tenderness of the medial aspect of the affected foot and ankle.  Positive for swelling over the anterior talofibular ligament. "   Positive for swelling and tenderness at the medial aspect over deltoid ligament.   Distal tibiofibular syndesmotic ligament appears intact.  Inversion and eversion against resistance are painful for the patient.   Negative external rotation and squeeze tests.   Dorsalis pedis and posterior tibial artery pulses are palpable.   Common peroneal nerve function is well preserved.   There is no evidence of a proximal fibular injury.      Diagnostics:  Right knee xrays 4 views were performed at [facility] on 9/8/2020. These images were independently viewed and interpreted by myself, my impression as follows:   · Medial tibial sclerosis, and space narrowing throughout    Right ankle and foot xrays 3 views each were ordered by Mamadou Tubbs PA-C and performed at [facility] 9/22/2020. These images were independently viewed and interpreted by myself, my impression as follows:  Findings: No acute bony abnormality is noted on foot or ankle films  Bony lesion: no  Soft tissues: within normal limits  Joint spaces: within normal limits  Hardware appropriately positioned: not applicable  Prior studies available for comparison: no        Procedures  See separate procedure note  Injection site was identified by physical examination and cleaned with Betadine and alcohol swabs. Prior to needle insertion, ethyl chloride spray was used for surface anesthesia. Sterile technique was used.       ASSESSMENT:  Rain was seen today for follow-up, pain, pain and knee injury.    Diagnoses and all orders for this visit:    Primary osteoarthritis of left knee  -     Large Joint Arthrocentesis: L knee    Acute pain of right knee  -     MRI Knee Right Without Contrast; Future    Acute right ankle pain  -     Cancel: XR Ankle 3+ View Right  -     XR Foot 3+ View Left; Future  -     XR Ankle 3+ View Right; Future  -     XR Ankle 3+ View Right  -     XR Foot 3+ View Left    Left foot pain  -     XR Foot 3+ View Left; Future  -      XR Foot 3+ View Left    Fall from slip, trip, or stumble, initial encounter  -     MRI Knee Right Without Contrast; Future      MDM  Number of Diagnoses or Management Options  Acute pain of right knee: new, needed workup  Acute right ankle pain: new, no workup  Fall from slip, trip, or stumble, initial encounter:   Left foot pain: new, no workup  Primary osteoarthritis of left knee: established, improving     Amount and/or Complexity of Data Reviewed  Tests in the radiology section of CPT®: ordered and reviewed  Independent visualization of images, tracings, or specimens: yes    Risk of Complications, Morbidity, and/or Mortality  Presenting problems: moderate        PLAN:   · Management of acute complicated injury requiring further work-up and management of chronic stable illness  · Discussed x-ray findings and how to proceed with treatment.  We will proceed with regularly scheduled left knee injection today.  We will proceed with scheduling MRI of right knee.  • Left knee steroid injection was discussed with the patient. Discussed indication, risks, benefits, and alternatives. Verbal consent was given to proceed with the procedure.   • Injection was performed from anteromedial approach.  Patient tolerated the procedure well and no complications were encountered.  • Discussion of orthopedic goals and activities and patient/guardian expressed understanding.  • Ice, heat, rest, compression and elevation of extremity as beneficial  • nsaids and/or tylenol as beneficial  • Instructed to refrain from heavy activity/rest the extremity for the next 24-48 hours  • Discussion regarding possibility of cortisol flare and what to expect if this occurs  • Watch for signs and symptoms of infection  • Call if adverse effect from injection therapy  • Follow up in 3 months for left knee injection and will schedule appointment for right knee follow-up based on MRI      Mamadou Tubbs PA-C  Encounter Date:  9/22/2020    Electronically signed by Mamadou Tubbs PA-C, 09/26/20, 11:34 PM EDT.      EMR Dragon/Transcription disclaimer:  Much of this encounter note is an electronic transcription/translation of spoken language to printed text. The electronic translation of spoken language may permit erroneous, or at times, nonsensical words or phrases to be inadvertently transcribed; Although I have reviewed the note for such errors, some may still exist.

## 2020-09-28 NOTE — TELEPHONE ENCOUNTER
Patient is still having issues bearing weight, pain is especially on her inside arch, she is keeping an ace wrap on this extremity do you still want her to wear it??    MRI right knee is scheduled for 10/01/20 results follow up is on 10/06/20

## 2020-09-28 NOTE — TELEPHONE ENCOUNTER
She can continue that or we could do a ankle stabilizing lace up brace. I would tell her insurance typically does not let us order/wont approve two MRIs at once but if it does not improve we may need to do that once the knee is done.

## 2020-10-06 ENCOUNTER — OFFICE VISIT (OUTPATIENT)
Dept: ORTHOPEDIC SURGERY | Facility: CLINIC | Age: 60
End: 2020-10-06

## 2020-10-06 VITALS — TEMPERATURE: 97.7 F | WEIGHT: 200 LBS | BODY MASS INDEX: 30.31 KG/M2 | HEIGHT: 68 IN

## 2020-10-06 DIAGNOSIS — M25.561 ACUTE PAIN OF RIGHT KNEE: Primary | ICD-10-CM

## 2020-10-06 DIAGNOSIS — M23.203 OLD COMPLEX TEAR OF MEDIAL MENISCUS OF RIGHT KNEE: ICD-10-CM

## 2020-10-06 PROCEDURE — 99213 OFFICE O/P EST LOW 20 MIN: CPT | Performed by: PHYSICIAN ASSISTANT

## 2020-10-06 PROCEDURE — 20610 DRAIN/INJ JOINT/BURSA W/O US: CPT | Performed by: PHYSICIAN ASSISTANT

## 2020-10-06 RX ORDER — LIDOCAINE HYDROCHLORIDE 10 MG/ML
2 INJECTION, SOLUTION INFILTRATION; PERINEURAL
Status: COMPLETED | OUTPATIENT
Start: 2020-10-06 | End: 2020-10-06

## 2020-10-06 RX ORDER — METHYLPREDNISOLONE ACETATE 80 MG/ML
160 INJECTION, SUSPENSION INTRA-ARTICULAR; INTRALESIONAL; INTRAMUSCULAR; SOFT TISSUE
Status: COMPLETED | OUTPATIENT
Start: 2020-10-06 | End: 2020-10-06

## 2020-10-06 RX ADMIN — LIDOCAINE HYDROCHLORIDE 2 ML: 10 INJECTION, SOLUTION INFILTRATION; PERINEURAL at 11:42

## 2020-10-06 RX ADMIN — METHYLPREDNISOLONE ACETATE 160 MG: 80 INJECTION, SUSPENSION INTRA-ARTICULAR; INTRALESIONAL; INTRAMUSCULAR; SOFT TISSUE at 11:42

## 2020-10-06 NOTE — PROGRESS NOTES
FOLLOW UP VISIT      Patient: Rain Camarena    MRN: [id number]    YOB: 1960    Chief Complaint   Patient presents with   • Right Knee - Follow-up, Results         HPI:  Rain Camarena is a 59 y.o. female who presents for follow up of her right knee pain and MRI results of right knee.  Her pain has been present for approximately one month after she fell from slipping out of her flip flops. She does report overall improvement since her initial visit regarding this complaint.    Pain Location:  RIGHT knee  Radiation: Medial knee  Quality: burning  Intensity/Severity: mild-moderate  Duration: since 9/8/20  Progression of symptoms: no worsening, reports improvement  Onset quality: sudden  Timing: intermittent  Aggravating Factors: walking, standing  Alleviating Factors: Tylenol, rest, heat, ice  Previous Episodes: none mentioned  Associated Symptoms: pain, swelling, clicking/popping  ADLs Affected: grooming/hygiene/toileting/personal care, ambulating, work related activities, recreational activities/sports    This is an established patient.  This is not a new complaint this examiner.    Past Medical History:   Diagnosis Date   • Anemia    • Anxiety    • Depression    • Gastritis    • Hernia    • History of stomach ulcers    • Osteoarthritis       Social History     Tobacco Use   • Smoking status: Never Smoker   • Smokeless tobacco: Never Used   Substance Use Topics   • Alcohol use: Yes     Comment: ONCE EVERY 2-4 WEEKS   • Drug use: Not on file       Family History   Problem Relation Age of Onset   • Hypertension Other    • Diabetes Other    • Heart disease Other    • Cancer Other         ANAL          Review of Systems   Constitutional: Negative.  Negative for fever.   Eyes: Negative.    Respiratory: Negative.    Cardiovascular: Negative.    Endocrine: Negative.    Musculoskeletal: Positive for arthralgias, gait problem and joint swelling.   Skin: Negative.  Negative for rash and wound.  "  Allergic/Immunologic: Negative.    Neurological: Negative for numbness.   Hematological: Negative.    Psychiatric/Behavioral: Negative.      Vitals:    10/06/20 1100   Temp: 97.7 °F (36.5 °C)   Weight: 90.7 kg (200 lb)   Height: 171.5 cm (67.5\")     Body mass index is 30.86 kg/m².       Physical Exam:   59 y.o. female awake, alert, oriented, in no acute distress and well developed, well nourished.  RIGHT knee  Positive for mild effusion of the knee joint and tenderness with palpation of the medial meniscus.        Positive for tenderness over the medial face of the tibia just distal to the joint line.  Range of motion-extension to flexion: 0-110 degrees.  Positive Apley's grinding test over the joint line.   Positive Pavan's.   Negative for instability on medial or lateral testing.   Anterior and posterior drawer testing is negative.   Lachman test is negative.  The dorsalis pedis and posterior tibial artery pulses are palpable. Common peroneal nerve function is well preserved.  Gait is cautious and somewhat antalgic.            Diagnostics:  Reviewed MRI report of right knee without contrast, performed at [facility] on 10/1/2020, summary of impression below:  · Tricompartmental degenerative OA with near full-thickness cartilage defect of the weightbearing portion of medial femoral condyle  · There is moderate cartilage thinning and marginal osteophytosis and lateral femorotibial compartment  · There is near full-thickness cartilage thinning and fissuring of the patella  · Complex degenerative tearing of the body and anterior horn of lateral meniscus with partial extrusion  · Medial meniscus shows degeneration of the body and posterior horn without signal to suggest a tear  · Moderate knee effusion  · Small popliteal cyst  · Remainder of the exam is normal        Procedures  See separate procedure note  Injection site was identified by physical examination and cleaned with Betadine and alcohol swabs. Prior to " needle insertion, ethyl chloride spray was used for surface anesthesia. Sterile technique was used.         ASSESSMENT:  Rain was seen today for follow-up and results.    Diagnoses and all orders for this visit:    Acute pain of right knee  -     Large Joint Arthrocentesis: R knee    Old complex tear of medial meniscus of right knee          MDM  Number of Diagnoses or Management Options  Acute pain of right knee: established, improving  Old complex tear of medial meniscus of right knee:      Amount and/or Complexity of Data Reviewed  Tests in the radiology section of CPT®: reviewed  Review and summarize past medical records: yes    Risk of Complications, Morbidity, and/or Mortality  Presenting problems: low            PLAN:   · Discussed MRI findings and how to proceed with treatment.  We discussed surgical options and I explained a knee arthroscopy would likely provide little benefit.  • Right knee steroid injection was discussed with the patient. Discussed indication, risks, benefits, and alternatives. Verbal consent was given to proceed with the procedure.   • Injection was performed from anteromedial approach.  Patient tolerated the procedure well and no complications were encountered.  • Discussion of orthopedic goals and activities and patient/guardian expressed understanding.  • Ice, heat, rest, compression and elevation of extremity as beneficial  • nsaids and/or tylenol as beneficial  • Instructed to refrain from heavy activity/rest the extremity for the next 24-48 hours  • Discussion regarding possibility of cortisol flare and what to expect if this occurs  • Watch for signs and symptoms of infection  • Call if adverse effect from injection therapy  • Follow as scheduled for right and left knee follow-up/injections      Mamadou Tubbs PA-C  Encounter Date: 10/6/2020    Electronically signed by Mamadou Tubbs PA-C, 10/06/20, 4:51 PM EDT.        EMR Dragon/Transcription disclaimer:  Much of this  encounter note is an electronic transcription/translation of spoken language to printed text. The electronic translation of spoken language may permit erroneous, or at times, nonsensical words or phrases to be inadvertently transcribed; Although I have reviewed the note for such errors, some may still exist.

## 2020-10-06 NOTE — PROGRESS NOTES
Procedure   Large Joint Arthrocentesis: R knee  Date/Time: 10/6/2020 11:42 AM  Consent given by: patient  Site marked: site marked  Timeout: Immediately prior to procedure a time out was called to verify the correct patient, procedure, equipment, support staff and site/side marked as required   Supporting Documentation  Indications: pain   Procedure Details  Location: knee - R knee  Preparation: Patient was prepped and draped in the usual sterile fashion  Needle size: 25 G  Approach: anteromedial  Medications administered: 2 mL lidocaine 1 %; 160 mg methylPREDNISolone acetate 80 MG/ML  Patient tolerance: patient tolerated the procedure well with no immediate complications      Electronically signed by Mamadou Tubbs PA-C, 10/06/20, 12:17 PM EDT.

## 2020-12-22 ENCOUNTER — OFFICE VISIT (OUTPATIENT)
Dept: ORTHOPEDIC SURGERY | Facility: CLINIC | Age: 60
End: 2020-12-22

## 2020-12-22 VITALS — TEMPERATURE: 97 F | BODY MASS INDEX: 31.39 KG/M2 | WEIGHT: 200 LBS | HEIGHT: 67 IN

## 2020-12-22 DIAGNOSIS — M17.12 PRIMARY OSTEOARTHRITIS OF LEFT KNEE: Primary | ICD-10-CM

## 2020-12-22 DIAGNOSIS — M17.11 PRIMARY OSTEOARTHRITIS OF RIGHT KNEE: ICD-10-CM

## 2020-12-22 PROCEDURE — 20610 DRAIN/INJ JOINT/BURSA W/O US: CPT | Performed by: PHYSICIAN ASSISTANT

## 2020-12-22 NOTE — PROGRESS NOTES
Procedure   Large Joint Arthrocentesis: R knee  Date/Time: 12/22/2020 2:45 PM  Consent given by: patient  Site marked: site marked  Timeout: Immediately prior to procedure a time out was called to verify the correct patient, procedure, equipment, support staff and site/side marked as required   Supporting Documentation  Indications: pain and joint swelling   Procedure Details  Location: knee - R knee  Preparation: Patient was prepped and draped in the usual sterile fashion  Needle size: 25 G  Approach: anteromedial  Medications administered: 32 mg Triamcinolone Acetonide 32 MG  Patient tolerance: patient tolerated the procedure well with no immediate complications    Large Joint Arthrocentesis: L knee  Date/Time: 12/22/2020 2:45 PM  Consent given by: patient  Site marked: site marked  Timeout: Immediately prior to procedure a time out was called to verify the correct patient, procedure, equipment, support staff and site/side marked as required   Supporting Documentation  Indications: pain and joint swelling   Procedure Details  Location: knee - L knee  Preparation: Patient was prepped and draped in the usual sterile fashion  Needle size: 25 G  Approach: anteromedial  Medications administered: 32 mg Triamcinolone Acetonide 32 MG  Patient tolerance: patient tolerated the procedure well with no immediate complications      Electronically signed by Maamdou Tubbs PA-C, 12/31/20, 10:16 AM EST.

## 2020-12-23 ENCOUNTER — TELEPHONE (OUTPATIENT)
Dept: ORTHOPEDIC SURGERY | Facility: CLINIC | Age: 60
End: 2020-12-23

## 2020-12-23 NOTE — TELEPHONE ENCOUNTER
Patient called stating after her injection she has had Itching and red bumps on her arms. She has taken Benadryl but wanted to know if there was anything else she could do for the itching to stop and the bumps to subside. I advised possibly try Zantac or a hydrocortisone topical ointment. Or to contact her pharmacy to see what the recommend.     Patient voiced understanding. I advised that I would inform Mamadou of the reaction .

## 2020-12-31 NOTE — PROGRESS NOTES
INJECTION      Patient: Rain Camarena    MRN: [id number]    YOB: 1960    Chief Complaint   Patient presents with   • Right Knee - Follow-up, Pain   • Left Knee - Follow-up, Pain   • Injections         History of Present Illness:  Rain Camarena is here today for injection therapy. She is receiving first time  BILATERAL knee injections of Zilretta. She has been receiving bilateral knee injections of depo-medrol with relief, although they are lasting less and less in duration. Treatment options have been discussed and she understands and consents. She is currently considering a knee replacement but has a lot of events scheduled in the coming year, which would make it difficult to do a knee replacement.      Physical Exam:   60 y.o. female awake, alert, oriented, in no acute distress and well developed, well nourished.  BILATERAL knee  There is moderate joint line tenderness at the medial aspect of the knee.   Positive for varus orientation of the knee.   Positive for crepitus throughout range of motion.   Negative for effusion.  Positive patellar grind test.   Negative Lachman test.    Negative anterior and posterior drawer.  Range of motion in extension and flexion is: 0-110 degrees.  Neurovascular status is intact.    Dorsalis pedis and posterior tibial artery pulses are palpable.    Common peroneal nerve function is well preserved.  Gait is cautious and antalgic.      Procedures  See separate procedure note  Injection site was identified by physical examination and cleaned with Betadine and alcohol swabs. Prior to needle insertion, ethyl chloride spray was used for surface anesthesia. Sterile technique was used.       ASSESSMENT:  Diagnoses and all orders for this visit:    1. Primary osteoarthritis of left knee (Primary)  -     Large Joint Arthrocentesis: L knee    2. Primary osteoarthritis of right knee  -     Large Joint Arthrocentesis: R knee          PLAN:   • Bilateral knee Zilretta  injection was discussed with the patient. Discussed indication, risks, benefits, and alternatives. Verbal consent was given to proceed with the procedure.   • Injection was performed from anteromedial approach.  Patient tolerated the procedure well and no complications were encountered.  • Discussion of orthopedic goals and activities and patient/guardian expressed understanding.  • Ice, heat, rest, compression and elevation of extremity as beneficial  • nsaids and/or tylenol as beneficial  • Instructed to refrain from heavy activity/rest the extremity for the next 24-48 hours  • Discussion regarding possibility of cortisol flare and what to expect if this occurs  • Watch for signs and symptoms of infection  • Call if adverse effect from injection therapy  • Follow up in 3 months      Mamadou Tubbs PA-C  Encounter Date: 12/22/2020    Electronically signed by Mamadou Tubbs PA-C, 12/31/20, 10:16 AM EST.      FERNANDO Dragon/Transcription disclaimer:  Much of this encounter note is an electronic transcription/translation of spoken language to printed text. The electronic translation of spoken language may permit erroneous, or at times, nonsensical words or phrases to be inadvertently transcribed; Although I have reviewed the note for such errors, some may still exist.

## 2021-03-23 ENCOUNTER — CLINICAL SUPPORT (OUTPATIENT)
Dept: ORTHOPEDIC SURGERY | Facility: CLINIC | Age: 61
End: 2021-03-23

## 2021-03-23 VITALS — WEIGHT: 200 LBS | BODY MASS INDEX: 31.39 KG/M2 | HEIGHT: 67 IN | TEMPERATURE: 96.4 F

## 2021-03-23 DIAGNOSIS — M17.11 PRIMARY OSTEOARTHRITIS OF RIGHT KNEE: ICD-10-CM

## 2021-03-23 DIAGNOSIS — M17.12 PRIMARY OSTEOARTHRITIS OF LEFT KNEE: Primary | ICD-10-CM

## 2021-03-23 PROCEDURE — 20610 DRAIN/INJ JOINT/BURSA W/O US: CPT | Performed by: PHYSICIAN ASSISTANT

## 2021-03-23 RX ORDER — METHYLPREDNISOLONE ACETATE 80 MG/ML
160 INJECTION, SUSPENSION INTRA-ARTICULAR; INTRALESIONAL; INTRAMUSCULAR; SOFT TISSUE
Status: COMPLETED | OUTPATIENT
Start: 2021-03-23 | End: 2021-03-23

## 2021-03-23 RX ADMIN — METHYLPREDNISOLONE ACETATE 160 MG: 80 INJECTION, SUSPENSION INTRA-ARTICULAR; INTRALESIONAL; INTRAMUSCULAR; SOFT TISSUE at 09:20

## 2021-03-23 RX ADMIN — METHYLPREDNISOLONE ACETATE 160 MG: 80 INJECTION, SUSPENSION INTRA-ARTICULAR; INTRALESIONAL; INTRAMUSCULAR; SOFT TISSUE at 09:19

## 2021-03-23 NOTE — PROGRESS NOTES
Large Joint Arthrocentesis: R knee  Date/Time: 3/23/2021 9:19 AM  Consent given by: patient  Site marked: site marked  Timeout: Immediately prior to procedure a time out was called to verify the correct patient, procedure, equipment, support staff and site/side marked as required   Supporting Documentation  Indications: pain   Procedure Details  Location: knee - R knee  Preparation: Patient was prepped and draped in the usual sterile fashion  Needle size: 25 G  Approach: anteromedial  Medications administered: 160 mg methylPREDNISolone acetate 80 MG/ML; 2 mL lidocaine (cardiac)  Patient tolerance: patient tolerated the procedure well with no immediate complications    Large Joint Arthrocentesis: L knee  Date/Time: 3/23/2021 9:20 AM  Consent given by: patient  Site marked: site marked  Timeout: Immediately prior to procedure a time out was called to verify the correct patient, procedure, equipment, support staff and site/side marked as required   Supporting Documentation  Indications: pain   Procedure Details  Location: knee - L knee  Preparation: Patient was prepped and draped in the usual sterile fashion  Needle size: 25 G  Approach: anteromedial  Medications administered: 2 mL lidocaine (cardiac); 160 mg methylPREDNISolone acetate 80 MG/ML  Patient tolerance: patient tolerated the procedure well with no immediate complications      Electronically signed by Mamadou Tubbs PA-C, 03/23/21, 9:39 AM EDT.

## 2021-03-23 NOTE — PROGRESS NOTES
"Chief Complaint  Follow-up and Pain of the Right Knee and Follow-up and Pain of the Left Knee    Subjective    History of Present Illness      Rain Camarena is a 60 y.o. female who presents to [facility] for is here today for injection therapy. She receives  BILATERAL knee injections of steroid. Since last injection with Zilretta, patient notes little relief relief of symptoms and states she has been hurting for the last 2 months. Treatment options have been discussed and she understands and consents.       Objective   Vital Signs:   Temp 96.4 °F (35.8 °C)   Ht 170.2 cm (67\")   Wt 90.7 kg (200 lb)   BMI 31.32 kg/m²     Physical Exam  60 y.o. female is awake, alert, oriented, in no acute distress and well developed, well nourished.  Ortho Exam   BILATERAL knee  There is mild-mod joint line tenderness at the medial aspect of the knee.   Positive for varus orientation of the knee.   Positive for crepitus throughout range of motion.   Negative for effusion.  Positive patellar grind test.   Negative Lachman test.    Negative anterior and posterior drawer.  Range of motion in extension and flexion is: 0-110 degrees.  Neurovascular status is intact.    Dorsalis pedis and posterior tibial artery pulses are palpable.    Common peroneal nerve function is well preserved.  Gait is cautious and antalgic.        Procedures   See separate procedure note  Injection site was identified by physical examination and cleaned with Betadine and alcohol swabs. Prior to needle insertion, ethyl chloride spray was used for surface anesthesia. Sterile technique was used.       Assessment   Assessment and Plan    Problem List Items Addressed This Visit        Musculoskeletal and Injuries    Osteoarthritis of left knee - Primary    Relevant Orders    Large Joint Arthrocentesis: L knee      Other Visit Diagnoses     Primary osteoarthritis of right knee        Relevant Orders    Large Joint Arthrocentesis: R knee "          Follow Up   • Bilateral knee steroid injection was discussed with the patient. Discussed indication, risks, benefits, and alternatives. Verbal consent was given to proceed with the procedure.   • Injection was performed from anteromedial approach.  Patient tolerated the procedure well and no complications were encountered.  • Discussion of orthopedic goals and activities and patient/guardian expressed understanding.  • Ice, heat, rest, compression and elevation of extremity as beneficial  • nsaids and/or tylenol as beneficial  • Instructed to refrain from heavy activity/rest the extremity for the next 24-48 hours  • Discussion regarding possibility of cortisol flare and what to expect if this occurs  • Watch for signs and symptoms of infection  • Call if adverse effect from injection therapy  • Follow up in 3 months  • Patient was given instructions and counseling regarding her condition or for health maintenance advice. Please see specific information pulled into the AVS if appropriate.     Mamadou Tubbs PA-C   Date of Encounter: 3/23/2021   Electronically signed by Mamadou Tubbs PA-C, 03/23/21, 9:40 AM EDT.     EMR Dragon/Transcription disclaimer:  Much of this encounter note is an electronic transcription/translation of spoken language to printed text. The electronic translation of spoken language may permit erroneous, or at times, nonsensical words or phrases to be inadvertently transcribed; Although I have reviewed the note for such errors, some may still exist.

## 2021-05-04 ENCOUNTER — CLINICAL SUPPORT (OUTPATIENT)
Dept: ORTHOPEDIC SURGERY | Facility: CLINIC | Age: 61
End: 2021-05-04

## 2021-05-04 VITALS — TEMPERATURE: 96.4 F | BODY MASS INDEX: 29.55 KG/M2 | HEIGHT: 68 IN | WEIGHT: 195 LBS

## 2021-05-04 DIAGNOSIS — M17.11 PRIMARY OSTEOARTHRITIS OF RIGHT KNEE: Primary | ICD-10-CM

## 2021-05-04 DIAGNOSIS — M17.12 PRIMARY OSTEOARTHRITIS OF LEFT KNEE: ICD-10-CM

## 2021-05-04 PROCEDURE — 20610 DRAIN/INJ JOINT/BURSA W/O US: CPT | Performed by: PHYSICIAN ASSISTANT

## 2021-05-04 RX ORDER — METHYLPREDNISOLONE ACETATE 80 MG/ML
160 INJECTION, SUSPENSION INTRA-ARTICULAR; INTRALESIONAL; INTRAMUSCULAR; SOFT TISSUE
Status: COMPLETED | OUTPATIENT
Start: 2021-05-04 | End: 2021-05-04

## 2021-05-04 RX ADMIN — METHYLPREDNISOLONE ACETATE 160 MG: 80 INJECTION, SUSPENSION INTRA-ARTICULAR; INTRALESIONAL; INTRAMUSCULAR; SOFT TISSUE at 15:50

## 2021-05-04 RX ADMIN — METHYLPREDNISOLONE ACETATE 160 MG: 80 INJECTION, SUSPENSION INTRA-ARTICULAR; INTRALESIONAL; INTRAMUSCULAR; SOFT TISSUE at 15:49

## 2021-05-04 NOTE — PROGRESS NOTES
"Chief Complaint  Follow-up and Pain of the Right Knee and Follow-up and Pain of the Left Knee    Subjective    History of Present Illness      Rain Camarena is a 60 y.o. female who presents to [facility] for is here today for injection therapy. She receives  BILATERAL knee injections of steroid. I decided to bring her back a little earlier this time due to an upcoming vacation and her wanting to make sure she felt good before going. She would also like to discuss her options since her pain is starting to interfere more with her daily activities. Treatment options have been discussed and she understands and consents.       Objective   Vital Signs:   Temp 96.4 °F (35.8 °C)   Ht 171.5 cm (67.5\")   Wt 88.5 kg (195 lb)   BMI 30.09 kg/m²     Physical Exam  60 y.o. female is awake, alert, oriented, in no acute distress and well developed, well nourished.  Ortho Exam   BILATERAL knee  There is mild-mod joint line tenderness at the medial aspect of the knee.   Positive for varus orientation of the knee.   Positive for crepitus throughout range of motion.   Negative for effusion.  Positive patellar grind test.   Negative Lachman test.    Negative anterior and posterior drawer.  Range of motion in extension and flexion is: 0-110 degrees.  Neurovascular status is intact.    Dorsalis pedis and posterior tibial artery pulses are palpable.    Common peroneal nerve function is well preserved.  Gait is cautious and antalgic.        Procedures   See separate procedure note  Injection site was identified by physical examination and cleaned with Betadine and alcohol swabs. Prior to needle insertion, ethyl chloride spray was used for surface anesthesia. Sterile technique was used.       Assessment   Assessment and Plan    Problem List Items Addressed This Visit        Musculoskeletal and Injuries    Osteoarthritis of left knee - Primary    Relevant Orders    Large Joint Arthrocentesis: L knee    "   Other Visit Diagnoses     Primary osteoarthritis of right knee        Relevant Orders    Large Joint Arthrocentesis: R knee        I spent 25 minutes caring for Rain on this date of service. This time includes time spent by me in the following activities:performing a medically appropriate examination and/or evaluation , counseling and educating the patient/family/caregiver and documenting information in the medical record. Time was also spent reviewing prior MRI to discuss surgical options. I advised a knee replacement for the right knee would really be her only option, not arthroscopic surgery. Since it has been a long time since left knee xrays were done, we will need those at next visit. She is considering surgery for the fall.    Follow Up   • Bilateral knee steroid injection was discussed with the patient. Discussed indication, risks, benefits, and alternatives. Verbal consent was given to proceed with the procedure.   • Injection was performed from anteromedial approach.  Patient tolerated the procedure well and no complications were encountered.  • Discussion of orthopedic goals and activities and patient/guardian expressed understanding.  • Ice, heat, rest, compression and elevation of extremity as beneficial  • nsaids and/or tylenol as beneficial  • Instructed to refrain from heavy activity/rest the extremity for the next 24-48 hours  • Discussion regarding possibility of cortisol flare and what to expect if this occurs  • Watch for signs and symptoms of infection  • Call if adverse effect from injection therapy  • Follow up in 3 months  • Patient was given instructions and counseling regarding her condition or for health maintenance advice. Please see specific information pulled into the AVS if appropriate.     Mamadou Tubbs PA-C   Date of Encounter: 5/4/2021   Electronically signed by Mamadou Tubbs PA-C, 05/04/21, 6:48 PM EDT.     EMR Dragon/Transcription disclaimer:  Much of this  encounter note is an electronic transcription/translation of spoken language to printed text. The electronic translation of spoken language may permit erroneous, or at times, nonsensical words or phrases to be inadvertently transcribed; Although I have reviewed the note for such errors, some may still exist.

## 2021-07-30 DIAGNOSIS — M17.12 PRIMARY OSTEOARTHRITIS OF LEFT KNEE: Primary | ICD-10-CM

## 2021-07-30 DIAGNOSIS — M17.11 PRIMARY OSTEOARTHRITIS OF RIGHT KNEE: ICD-10-CM

## 2021-08-02 ENCOUNTER — HOSPITAL ENCOUNTER (OUTPATIENT)
Dept: GENERAL RADIOLOGY | Facility: HOSPITAL | Age: 61
Discharge: HOME | End: 2021-08-02
Admitting: PHYSICIAN ASSISTANT

## 2021-08-02 ENCOUNTER — OFFICE VISIT (OUTPATIENT)
Dept: ORTHOPEDIC SURGERY | Facility: CLINIC | Age: 61
End: 2021-08-02

## 2021-08-02 DIAGNOSIS — M17.11 PRIMARY OSTEOARTHRITIS OF RIGHT KNEE: ICD-10-CM

## 2021-08-02 DIAGNOSIS — M17.12 PRIMARY OSTEOARTHRITIS OF LEFT KNEE: ICD-10-CM

## 2021-08-02 DIAGNOSIS — M17.12 PRIMARY OSTEOARTHRITIS OF LEFT KNEE: Primary | ICD-10-CM

## 2021-08-02 PROCEDURE — 20610 DRAIN/INJ JOINT/BURSA W/O US: CPT | Performed by: PHYSICIAN ASSISTANT

## 2021-08-02 PROCEDURE — 99214 OFFICE O/P EST MOD 30 MIN: CPT | Performed by: PHYSICIAN ASSISTANT

## 2021-08-02 PROCEDURE — 73562 X-RAY EXAM OF KNEE 3: CPT

## 2021-08-02 RX ORDER — METHYLPREDNISOLONE ACETATE 80 MG/ML
160 INJECTION, SUSPENSION INTRA-ARTICULAR; INTRALESIONAL; INTRAMUSCULAR; SOFT TISSUE
Status: COMPLETED | OUTPATIENT
Start: 2021-08-02 | End: 2021-08-02

## 2021-08-02 RX ADMIN — METHYLPREDNISOLONE ACETATE 160 MG: 80 INJECTION, SUSPENSION INTRA-ARTICULAR; INTRALESIONAL; INTRAMUSCULAR; SOFT TISSUE at 16:11

## 2021-08-02 RX ADMIN — METHYLPREDNISOLONE ACETATE 160 MG: 80 INJECTION, SUSPENSION INTRA-ARTICULAR; INTRALESIONAL; INTRAMUSCULAR; SOFT TISSUE at 16:13

## 2021-08-02 NOTE — PROGRESS NOTES
Chief Complaint  Follow-up of the Left Knee and Follow-up of the Right Knee    Subjective    History of Present Illness      Rain Camarena is a 60 y.o. female who presents to [facility] for is here today for follow-up for bilateral knee pain. She reports the steroid injections she has been receiving do well for 2 months and because her 3rd month is fairly painful, she is considering surgery. She recently went on a vacation and did great because she was given steroid injections early, specifically for the trip. Today she has had xrays of both knees. She states she is experiencing a grinding sensation in the knees upon bending forward. She does report great flexibility in the left leg/knee though and is concerned what a knee replacement would mean in terms of not being able to do things she can do now.        Objective   Vital Signs:   There were no vitals taken for this visit.    Physical Exam  60 y.o. female is awake, alert, oriented, in no acute distress and well developed, well nourished.  Ortho Exam   BILATERAL knee  There is mild-mod joint line tenderness at the medial aspect of the knee.   Positive for varus orientation of the knee.   Positive for crepitus throughout range of motion.   Negative for effusion.  Positive patellar grind test.   Negative Lachman test.    Negative anterior and posterior drawer.  Range of motion in extension and flexion is: 0-110 degrees.  Neurovascular status is intact.    Dorsalis pedis and posterior tibial artery pulses are palpable.    Common peroneal nerve function is well preserved.  Gait is cautious and antalgic.      RESULTS  Bilateral knee xrays 3 views were ordered by Mamadou Tubbs PA-C. Performed at [facility] on 8/2/21. Images were independently viewed and interpreted by myself, my impression as follows:   Findings: Left knee: Moderate medial compartment narrowing and subchondral sclerosis at medial tibial  plateau, lateral compartment shows mild degenerative change, patellofemoral also with moderate degenerative change.  Right knee: Moderate medial and patellofemoral compartment degenerative change, lateral compartment with mild degenerative change.  Bony lesion: no  Soft tissues: within normal limits  Joint spaces: decreased  Hardware appropriately positioned: not applicable  Prior studies available for comparison: yes            Large Joint Arthrocentesis: L knee  Date/Time: 8/2/2021 4:11 PM  Consent given by: patient  Site marked: site marked  Timeout: Immediately prior to procedure a time out was called to verify the correct patient, procedure, equipment, support staff and site/side marked as required   Supporting Documentation  Indications: pain   Procedure Details  Location: knee - L knee  Preparation: Patient was prepped and draped in the usual sterile fashion  Needle size: 25 G  Approach: anteromedial  Medications administered: 160 mg methylPREDNISolone acetate 80 MG/ML; 2 mL lidocaine (cardiac)  Patient tolerance: patient tolerated the procedure well with no immediate complications    Large Joint Arthrocentesis: R knee  Date/Time: 8/2/2021 4:13 PM  Consent given by: patient  Site marked: site marked  Timeout: Immediately prior to procedure a time out was called to verify the correct patient, procedure, equipment, support staff and site/side marked as required   Supporting Documentation  Indications: pain   Procedure Details  Location: knee - R knee  Preparation: Patient was prepped and draped in the usual sterile fashion  Needle size: 25 G  Approach: anteromedial  Medications administered: 160 mg methylPREDNISolone acetate 80 MG/ML; 2 mL lidocaine (cardiac)  Patient tolerance: patient tolerated the procedure well with no immediate complications           Injection site was identified by physical examination and cleaned with Betadine and alcohol swabs. Prior to needle insertion, ethyl chloride spray was used  for surface anesthesia. Sterile technique was used.       Assessment   Assessment and Plan    Problem List Items Addressed This Visit        Musculoskeletal and Injuries    Osteoarthritis of left knee - Primary    Relevant Orders    Large Joint Arthrocentesis: L knee    Primary osteoarthritis of right knee    Relevant Orders    Large Joint Arthrocentesis: R knee          Follow Up   · Discussed xray findings in detail. Discussed treatment options, including continuing with steroid injections and proceeding with surgery. If we can continue to effectively manage her pain I think she could hold off on surgery. We discussed the things that she needs to consider such as she is unable to kneel down to garden now and she won't likely be able to do that after a knee replacement either, therefore she is not gaining anything with surgery in that aspect. She would like to proceed with steroid injections today and continue to consider the surgery.  • Bilateral knee steroid injection was discussed with the patient. Discussed indication, risks, benefits, and alternatives. Verbal consent was given to proceed with the procedure.   • Injection was performed from anteromedial approach.  Patient tolerated the procedure well and no complications were encountered.  • Discussion of orthopedic goals and activities and patient/guardian expressed understanding.  • Ice, heat, rest, compression and elevation of extremity as beneficial  • nsaids and/or tylenol as beneficial  • Instructed to refrain from heavy activity/rest the extremity for the next 24-48 hours  • Discussion regarding possibility of cortisol flare and what to expect if this occurs  • Watch for signs and symptoms of infection  • Call if adverse effect from injection therapy  • Follow up in 3 months  • Patient was given instructions and counseling regarding her condition or for health maintenance advice. Please see specific information pulled into the AVS if appropriate.      Mamadou Tubbs PA-C   Date of Encounter: 8/2/2021   Electronically signed by Mamadou Tubbs PA-C, 08/02/21, 5:59 PM EDT.     EMR Dragon/Transcription disclaimer:  Much of this encounter note is an electronic transcription/translation of spoken language to printed text. The electronic translation of spoken language may permit erroneous, or at times, nonsensical words or phrases to be inadvertently transcribed; Although I have reviewed the note for such errors, some may still exist.

## 2021-11-02 ENCOUNTER — CLINICAL SUPPORT (OUTPATIENT)
Dept: ORTHOPEDIC SURGERY | Facility: CLINIC | Age: 61
End: 2021-11-02

## 2021-11-02 VITALS — HEIGHT: 68 IN | BODY MASS INDEX: 29.55 KG/M2 | WEIGHT: 195 LBS | TEMPERATURE: 98.2 F

## 2021-11-02 DIAGNOSIS — M17.11 PRIMARY OSTEOARTHRITIS OF RIGHT KNEE: ICD-10-CM

## 2021-11-02 DIAGNOSIS — M17.12 PRIMARY OSTEOARTHRITIS OF LEFT KNEE: Primary | ICD-10-CM

## 2021-11-02 PROCEDURE — 20610 DRAIN/INJ JOINT/BURSA W/O US: CPT | Performed by: PHYSICIAN ASSISTANT

## 2021-11-02 RX ORDER — METHYLPREDNISOLONE ACETATE 80 MG/ML
160 INJECTION, SUSPENSION INTRA-ARTICULAR; INTRALESIONAL; INTRAMUSCULAR; SOFT TISSUE
Status: COMPLETED | OUTPATIENT
Start: 2021-11-02 | End: 2021-11-02

## 2021-11-02 RX ADMIN — METHYLPREDNISOLONE ACETATE 160 MG: 80 INJECTION, SUSPENSION INTRA-ARTICULAR; INTRALESIONAL; INTRAMUSCULAR; SOFT TISSUE at 10:05

## 2021-11-02 NOTE — PROGRESS NOTES
Chief Complaint  Follow-up and Pain of the Left Knee, Follow-up and Pain of the Right Knee, and Injections    Subjective    History of Present Illness      Rain Camarena is a 61 y.o. female who presents to [facility] for is here today for injection therapy. She receives bilateral knee injections of steroid. She reports she continues to get good relief but that it wears off after about 2 months. She has tried the Synvisc in the past and states it really hurt and did not help. She is wondering if there is another that may be helpful. She just recently had steroid injections in her back with pain management and she reports she would just like to try something different than steroid due to the amount of steroids she is getting. She is also a month out from an ankle biopsy that was thought to be a cancer but she states the report showed it was a wart. She states they told her they had a hard time getting a decent sample on biopsy so she is concerned it could actually be different than what the path report shows.        Objective   Vital Signs:   There were no vitals taken for this visit.    Physical Exam  60 y.o. female is awake, alert, oriented, in no acute distress and well developed, well nourished.  Ortho Exam   BILATERAL knee  There is mild-mod joint line tenderness at the medial aspect of the knee.   Positive for varus orientation of the knee.   Positive for crepitus throughout range of motion.   Negative for effusion.  Positive patellar grind test.   Negative Lachman test.    Negative anterior and posterior drawer.  Range of motion in extension and flexion is: 0-110 degrees.  Neurovascular status is intact.    Dorsalis pedis and posterior tibial artery pulses are palpable.    Common peroneal nerve function is well preserved.  Gait is cautious and antalgic.      PROCEDURE  Large Joint Arthrocentesis: R knee  Date/Time: 11/2/2021 10:05 AM  Consent given by: patient  Site marked: site  marked  Timeout: Immediately prior to procedure a time out was called to verify the correct patient, procedure, equipment, support staff and site/side marked as required   Supporting Documentation  Indications: pain and joint swelling   Procedure Details  Location: knee - R knee  Preparation: Patient was prepped and draped in the usual sterile fashion  Needle size: 25 G  Approach: anteromedial  Medications administered: 160 mg methylPREDNISolone acetate 80 MG/ML; 2 mL lidocaine (cardiac)  Patient tolerance: patient tolerated the procedure well with no immediate complications    Large Joint Arthrocentesis: L knee  Date/Time: 11/2/2021 10:05 AM  Consent given by: patient  Site marked: site marked  Timeout: Immediately prior to procedure a time out was called to verify the correct patient, procedure, equipment, support staff and site/side marked as required   Supporting Documentation  Indications: pain and joint swelling   Procedure Details  Location: knee - L knee  Preparation: Patient was prepped and draped in the usual sterile fashion  Needle size: 25 G  Approach: anteromedial  Medications administered: 160 mg methylPREDNISolone acetate 80 MG/ML; 2 mL lidocaine (cardiac)  Patient tolerance: patient tolerated the procedure well with no immediate complications      Injection site was identified by physical examination and cleaned with Betadine and alcohol swabs. Prior to needle insertion, ethyl chloride spray was used for surface anesthesia. Sterile technique was used.       Assessment   Assessment and Plan    Problem List Items Addressed This Visit        Musculoskeletal and Injuries    Osteoarthritis of left knee - Primary    Relevant Orders    Large Joint Arthrocentesis: L knee    Visco Treatment    Primary osteoarthritis of right knee    Relevant Orders    Large Joint Arthrocentesis: R knee    Visco Treatment          Follow Up   • Bilateral knee Depo-Medrol injection was discussed with the patient. Discussed  indication, risks, benefits, and alternatives. Verbal consent was given to proceed with the procedure. Injection was performed from anteromedial approach.  Patient tolerated the procedure well and no complications were encountered.  • Discussion of orthopedic goals and activities and patient/guardian expressed understanding.  • Ice, heat, rest, compression and elevation of extremity as beneficial  • nsaids and/or tylenol as beneficial  • Instructed to refrain from heavy activity/rest the extremity for the next 24-48 hours  • Discussion regarding possibility of cortisol flare and what to expect if this occurs  • Watch for signs and symptoms of infection  • Call if adverse effect from injection therapy  • Follow up in 3 months. Will attempt approval of orthovisc to see if a series will help whereas the one dose does not.  • Patient was given instructions and counseling regarding her condition or for health maintenance advice. Please see specific information pulled into the AVS if appropriate.     Mamadou Tubbs PA-C   Date of Encounter: 11/2/2021   Electronically signed by Mamadou Tubbs PA-C, 11/02/21, 10:11 AM EDT.     EMR Dragon/Transcription disclaimer:  Much of this encounter note is an electronic transcription/translation of spoken language to printed text. The electronic translation of spoken language may permit erroneous, or at times, nonsensical words or phrases to be inadvertently transcribed; Although I have reviewed the note for such errors, some may still exist.

## 2022-02-02 ENCOUNTER — CLINICAL SUPPORT (OUTPATIENT)
Dept: ORTHOPEDIC SURGERY | Facility: CLINIC | Age: 62
End: 2022-02-02

## 2022-02-02 VITALS — BODY MASS INDEX: 29.55 KG/M2 | HEIGHT: 68 IN | TEMPERATURE: 98.2 F | WEIGHT: 195 LBS

## 2022-02-02 DIAGNOSIS — M17.12 PRIMARY OSTEOARTHRITIS OF LEFT KNEE: Primary | ICD-10-CM

## 2022-02-02 DIAGNOSIS — M17.11 PRIMARY OSTEOARTHRITIS OF RIGHT KNEE: ICD-10-CM

## 2022-02-02 PROCEDURE — 20610 DRAIN/INJ JOINT/BURSA W/O US: CPT | Performed by: PHYSICIAN ASSISTANT

## 2022-02-02 RX ORDER — METHYLPREDNISOLONE ACETATE 80 MG/ML
160 INJECTION, SUSPENSION INTRA-ARTICULAR; INTRALESIONAL; INTRAMUSCULAR; SOFT TISSUE
Status: COMPLETED | OUTPATIENT
Start: 2022-02-02 | End: 2022-02-02

## 2022-02-02 RX ADMIN — METHYLPREDNISOLONE ACETATE 160 MG: 80 INJECTION, SUSPENSION INTRA-ARTICULAR; INTRALESIONAL; INTRAMUSCULAR; SOFT TISSUE at 10:37

## 2022-02-02 NOTE — PROGRESS NOTES
Chief Complaint  Follow-up and Pain of the Left Knee and Follow-up and Pain of the Right Knee    Subjective    History of Present Illness      Rain Camarena is a 61 y.o. female who presents to [facility] for is here today for injection therapy. She receives bilateral knee injections of steroid. She reports she continues to get good relief but that it wears off after about 2 months.  Today we were planning to do bilateral Orthovisc injections but due to an ongoing issue with the right ankle and an area that is having difficulty with healing, due to a biopsy, we are discussing changing those plans.  She is concerned that introducing something new while she has all of her issues going on with her right ankle that that would be a bad idea.  Her general surgeon did not mind her getting the steroid injections in regards to the wound on the ankle.  She is not having any pain in the right knee today due to decreased use of that knee although she is having increased pain in the left knee from compensating so much.        Objective   Vital Signs:   There were no vitals taken for this visit.    Physical Exam  60 y.o. female is awake, alert, oriented, in no acute distress and well developed, well nourished.  Ortho Exam   Left knee  There is mod joint line tenderness at the medial aspect of the knee.   Positive for varus orientation of the knee.   Positive for crepitus throughout range of motion.   Negative for effusion.  Positive patellar grind test.   Negative Lachman test.    Negative anterior and posterior drawer.  Range of motion in extension and flexion is: 0-110 degrees.  Neurovascular status is intact.    Dorsalis pedis and posterior tibial artery pulses are palpable.    Common peroneal nerve function is well preserved.  Gait is cautious and antalgic.      PROCEDURE  Large Joint Arthrocentesis: L knee  Date/Time: 2/2/2022 10:37 AM  Consent given by: patient  Site marked: site  marked  Timeout: Immediately prior to procedure a time out was called to verify the correct patient, procedure, equipment, support staff and site/side marked as required   Supporting Documentation  Indications: pain and joint swelling   Procedure Details  Location: knee - L knee  Preparation: Patient was prepped and draped in the usual sterile fashion  Needle size: 22 G  Approach: anterolateral  Medications administered: 160 mg methylPREDNISolone acetate 80 MG/ML; 2 mL lidocaine (cardiac)  Patient tolerance: patient tolerated the procedure well with no immediate complications         Injection site was identified by physical examination and cleaned with Betadine and alcohol swabs. Prior to needle insertion, ethyl chloride spray was used for surface anesthesia. Sterile technique was used.       Assessment   Assessment and Plan    Problem List Items Addressed This Visit        Musculoskeletal and Injuries    Primary osteoarthritis of right knee    Primary osteoarthritis of left knee - Primary    Relevant Orders    Large Joint Arthrocentesis: L knee          Follow Up   • Left knee steroid injection was discussed with the patient. Discussed indication, risks, benefits, and alternatives. Verbal consent was given to proceed with the procedure. Injection was performed from anteromedial approach.  Patient tolerated the procedure well and no complications were encountered.  • Discussion of orthopedic goals and activities and patient/guardian expressed understanding.  • Ice, heat, rest, compression and elevation of extremity as beneficial  • nsaids and/or tylenol as beneficial  • Instructed to refrain from heavy activity/rest the extremity for the next 24-48 hours  • Discussion regarding possibility of cortisol flare and what to expect if this occurs  • Watch for signs and symptoms of infection  • Call if adverse effect from injection therapy  • Follow up in 3 months but advised to call for an earlier appointment if the  right knee started bothering her or if she wanted to proceed with the Orthovisc  • Patient was given instructions and counseling regarding her condition or for health maintenance advice. Please see specific information pulled into the AVS if appropriate.     Mamadou Tubbs PA-C   Date of Encounter: 2/2/2022    Electronically signed by Mamadou Tubbs PA-C, 02/02/22, 12:54 PM EST.      EMR Dragon/Transcription disclaimer:  Much of this encounter note is an electronic transcription/translation of spoken language to printed text. The electronic translation of spoken language may permit erroneous, or at times, nonsensical words or phrases to be inadvertently transcribed; Although I have reviewed the note for such errors, some may still exist.

## 2022-05-04 ENCOUNTER — CLINICAL SUPPORT (OUTPATIENT)
Dept: ORTHOPEDIC SURGERY | Facility: CLINIC | Age: 62
End: 2022-05-04

## 2022-05-04 VITALS — WEIGHT: 195 LBS | TEMPERATURE: 98.4 F | BODY MASS INDEX: 29.55 KG/M2 | HEIGHT: 68 IN

## 2022-05-04 DIAGNOSIS — M17.11 PRIMARY OSTEOARTHRITIS OF RIGHT KNEE: ICD-10-CM

## 2022-05-04 DIAGNOSIS — M17.12 PRIMARY OSTEOARTHRITIS OF LEFT KNEE: Primary | ICD-10-CM

## 2022-05-04 PROCEDURE — 20610 DRAIN/INJ JOINT/BURSA W/O US: CPT | Performed by: PHYSICIAN ASSISTANT

## 2022-05-04 RX ORDER — TRIAMCINOLONE ACETONIDE 40 MG/ML
80 INJECTION, SUSPENSION INTRA-ARTICULAR; INTRAMUSCULAR
Status: COMPLETED | OUTPATIENT
Start: 2022-05-04 | End: 2022-05-04

## 2022-05-04 RX ADMIN — TRIAMCINOLONE ACETONIDE 80 MG: 40 INJECTION, SUSPENSION INTRA-ARTICULAR; INTRAMUSCULAR at 10:57

## 2022-05-04 NOTE — PROGRESS NOTES
Chief Complaint  Follow-up and Pain of the Right Knee and Follow-up and Pain of the Left Knee    Subjective    History of Present Illness      Rain Camarena is a 61 y.o. female who presents to [facility] for is here today for injection therapy. She receives bilateral knee injections of steroid but she had previously been approved for Orthovisc through specialty pharmacy or buy and bill. We did discuss the potential cost based on her out of pocket costs and depending on what she has already met. She would like to proceed with the right knee steroid and left knee orthovisc.         Objective   Vital Signs:   There were no vitals taken for this visit.    Physical Exam  60 y.o. female is awake, alert, oriented, in no acute distress and well developed, well nourished.  Ortho Exam   BILATERAL knee  There is moderate joint line tenderness at the medial aspect of the left knee and mild at the right knee.  Positive for varus orientation of the knee.   Positive for crepitus throughout range of motion.   Negative for effusion.  Positive patellar grind test.   Negative Lachman test.    Negative anterior and posterior drawer.  Range of motion in extension and flexion is: 0-110 degrees.  Neurovascular status is intact.    Dorsalis pedis and posterior tibial artery pulses are palpable.    Common peroneal nerve function is well preserved.  Gait is cautious and antalgic.          PROCEDURE  Large Joint Arthrocentesis: R knee  Date/Time: 5/4/2022 10:57 AM  Consent given by: patient  Site marked: site marked  Timeout: Immediately prior to procedure a time out was called to verify the correct patient, procedure, equipment, support staff and site/side marked as required   Supporting Documentation  Indications: pain   Procedure Details  Location: knee - R knee  Preparation: Patient was prepped and draped in the usual sterile fashion  Needle size: 25 G  Approach: anteromedial  Medications administered: 80  mg triamcinolone acetonide 40 MG/ML; 2 mL lidocaine (cardiac)  Patient tolerance: patient tolerated the procedure well with no immediate complications    Large Joint Arthrocentesis: L knee  Date/Time: 5/4/2022 10:58 AM  Consent given by: patient  Site marked: site marked  Timeout: Immediately prior to procedure a time out was called to verify the correct patient, procedure, equipment, support staff and site/side marked as required   Supporting Documentation  Indications: pain   Procedure Details  Location: knee - L knee  Preparation: Patient was prepped and draped in the usual sterile fashion  Needle size: 25 G  Approach: anteromedial  Medications administered: 30 mg Hyaluronan 30 MG/2ML; 2 mL lidocaine (cardiac)  Patient tolerance: patient tolerated the procedure well with no immediate complications         Injection site was identified by physical examination and cleaned with Betadine and alcohol swabs. Prior to needle insertion, ethyl chloride spray was used for surface anesthesia. Sterile technique was used.       Assessment   Assessment and Plan    Problem List Items Addressed This Visit        Musculoskeletal and Injuries    Primary osteoarthritis of right knee    Relevant Orders    Large Joint Arthrocentesis: R knee    Primary osteoarthritis of left knee - Primary    Relevant Orders    Large Joint Arthrocentesis: L knee          Follow Up   • Left knee Orthovisc and right knee steroid injection was discussed with the patient. Discussed indication, risks, benefits, and alternatives. Verbal consent was given to proceed with the procedure. Injections performed from anteromedial approach.  Patient tolerated the procedure well and no complications were encountered.  • Discussion of orthopedic goals and activities and patient/guardian expressed understanding.  • Ice, heat, rest, compression and elevation of extremity as beneficial  • nsaids and/or tylenol as beneficial  • Instructed to refrain from heavy  activity/rest the extremity for the next 24-48 hours  • Discussion regarding possibility of cortisol flare and what to expect if this occurs  • Watch for signs and symptoms of infection  • Call if adverse effect from injection therapy  • Follow up in 1 week for orthovisc #2  • Patient was given instructions and counseling regarding her condition or for health maintenance advice. Please see specific information pulled into the AVS if appropriate.     Mamadou Tubbs PA-C   Date of Encounter: 5/4/2022   Electronically signed by Mamadou Tubbs PA-C, 05/04/22, 11:34 AM EDT.       EMR Dragon/Transcription disclaimer:  Much of this encounter note is an electronic transcription/translation of spoken language to printed text. The electronic translation of spoken language may permit erroneous, or at times, nonsensical words or phrases to be inadvertently transcribed; Although I have reviewed the note for such errors, some may still exist.

## 2022-05-11 ENCOUNTER — CLINICAL SUPPORT (OUTPATIENT)
Dept: ORTHOPEDIC SURGERY | Facility: CLINIC | Age: 62
End: 2022-05-11

## 2022-05-11 VITALS — WEIGHT: 198 LBS | TEMPERATURE: 97.3 F | BODY MASS INDEX: 30.01 KG/M2 | HEIGHT: 68 IN

## 2022-05-11 DIAGNOSIS — M17.12 PRIMARY OSTEOARTHRITIS OF LEFT KNEE: Primary | ICD-10-CM

## 2022-05-11 PROCEDURE — 20610 DRAIN/INJ JOINT/BURSA W/O US: CPT | Performed by: PHYSICIAN ASSISTANT

## 2022-05-11 RX ORDER — LIDOCAINE HYDROCHLORIDE 10 MG/ML
2 INJECTION, SOLUTION INFILTRATION; PERINEURAL
Status: COMPLETED | OUTPATIENT
Start: 2022-05-11 | End: 2022-05-11

## 2022-05-11 RX ORDER — METHION/INOS/CHOL BT/B COM/LIV 110MG-86MG
100 CAPSULE ORAL
COMMUNITY

## 2022-05-11 RX ADMIN — LIDOCAINE HYDROCHLORIDE 2 ML: 10 INJECTION, SOLUTION INFILTRATION; PERINEURAL at 09:07

## 2022-05-11 NOTE — PROGRESS NOTES
Chief Complaint  Follow-up and Pain of the Left Knee    Subjective    History of Present Illness      Rain Camarena is a 61 y.o. female who presents to [facility] for is here today for injection therapy. She is receiving her first series of Orthovisc injections, today receiving #2 of 3. She states she can tell a difference in pain after the first injection and feels as if it is helping.        Objective   Vital Signs:   There were no vitals taken for this visit.    Physical Exam  60 y.o. female is awake, alert, oriented, in no acute distress and well developed, well nourished.  Ortho Exam   BILATERAL knee  There is moderate joint line tenderness at the medial aspect of the left knee and mild at the right knee.  Positive for varus orientation of the knee.   Positive for crepitus throughout range of motion.   Negative for effusion.  Positive patellar grind test.   Negative Lachman test.    Negative anterior and posterior drawer.  Range of motion in extension and flexion is: 0-110 degrees.  Neurovascular status is intact.    Dorsalis pedis and posterior tibial artery pulses are palpable.    Common peroneal nerve function is well preserved.  Gait is cautious and antalgic.          PROCEDURE  Large Joint Arthrocentesis: L knee  Date/Time: 5/11/2022 9:07 AM  Consent given by: patient  Site marked: site marked  Timeout: Immediately prior to procedure a time out was called to verify the correct patient, procedure, equipment, support staff and site/side marked as required   Supporting Documentation  Indications: pain   Procedure Details  Location: knee - L knee  Preparation: Patient was prepped and draped in the usual sterile fashion  Needle size: 25 G  Approach: anteromedial  Medications administered: 30 mg Hyaluronan 30 MG/2ML; 2 mL lidocaine 1 %  Patient tolerance: patient tolerated the procedure well with no immediate complications         Injection site was identified by physical  examination and cleaned with Betadine and alcohol swabs. Prior to needle insertion, ethyl chloride spray was used for surface anesthesia. Sterile technique was used.       Assessment   Assessment and Plan    Problem List Items Addressed This Visit        Musculoskeletal and Injuries    Primary osteoarthritis of left knee - Primary    Relevant Orders    Large Joint Arthrocentesis: L knee          Follow Up   • Left knee Orthovisc (#2) injection was discussed with the patient.  Verbal consent was given to proceed with the procedure. Injections performed from anteromedial approach.  Patient tolerated the procedure well and no complications were encountered.  • Discussion of orthopedic goals and activities and patient/guardian expressed understanding.  • Ice, heat, rest, compression and elevation of extremity as beneficial  • nsaids and/or tylenol as beneficial  • Instructed to refrain from heavy activity/rest the extremity for the next 24-48 hours  • Discussion regarding possibility of cortisol flare and what to expect if this occurs  • Watch for signs and symptoms of infection  • Call if adverse effect from injection therapy  • Follow up in 1 week for orthovisc #3  • Patient was given instructions and counseling regarding her condition or for health maintenance advice. Please see specific information pulled into the AVS if appropriate.     Mamadou Tubbs PA-C   Date of Encounter: 5/11/2022     Electronically signed by Mamadou Tubbs PA-C, 05/11/22, 9:31 AM EDT.     EMR Dragon/Transcription disclaimer:  Much of this encounter note is an electronic transcription/translation of spoken language to printed text. The electronic translation of spoken language may permit erroneous, or at times, nonsensical words or phrases to be inadvertently transcribed; Although I have reviewed the note for such errors, some may still exist.

## 2022-05-20 ENCOUNTER — CLINICAL SUPPORT (OUTPATIENT)
Dept: ORTHOPEDIC SURGERY | Facility: CLINIC | Age: 62
End: 2022-05-20

## 2022-05-20 VITALS — HEIGHT: 68 IN | TEMPERATURE: 97.5 F | WEIGHT: 198 LBS | BODY MASS INDEX: 30.01 KG/M2

## 2022-05-20 DIAGNOSIS — M17.12 PRIMARY OSTEOARTHRITIS OF LEFT KNEE: Primary | ICD-10-CM

## 2022-05-20 PROCEDURE — 20610 DRAIN/INJ JOINT/BURSA W/O US: CPT | Performed by: PHYSICIAN ASSISTANT

## 2022-05-20 RX ORDER — LIDOCAINE HYDROCHLORIDE 10 MG/ML
2 INJECTION, SOLUTION INFILTRATION; PERINEURAL
Status: COMPLETED | OUTPATIENT
Start: 2022-05-20 | End: 2022-05-20

## 2022-05-20 RX ADMIN — LIDOCAINE HYDROCHLORIDE 2 ML: 10 INJECTION, SOLUTION INFILTRATION; PERINEURAL at 08:32

## 2022-05-20 NOTE — PROGRESS NOTES
Large Joint Arthrocentesis: L knee  Date/Time: 5/20/2022 8:32 AM  Consent given by: patient  Site marked: site marked  Timeout: Immediately prior to procedure a time out was called to verify the correct patient, procedure, equipment, support staff and site/side marked as required   Supporting Documentation  Indications: pain   Procedure Details  Location: knee - L knee  Preparation: Patient was prepped and draped in the usual sterile fashion  Needle size: 18 G  Approach: anteromedial  Medications administered: 30 mg Hyaluronan 30 MG/2ML; 2 mL lidocaine 1 %  Patient tolerance: patient tolerated the procedure well with no immediate complications      Electronically signed by Mamadou Tubbs PA-C, 05/20/22, 9:54 AM EDT.

## 2022-05-20 NOTE — PROGRESS NOTES
Chief Complaint  Follow-up and Pain of the Left Knee and Injections    Subjective    History of Present Illness      Rain Camarena is a 61 y.o. female who presents to [facility] for is here today for injection therapy. She is receiving her first series of Orthovisc injections, today receiving #3 of 3.  She reports significant improvement in her left knee pain, to the point that she is able to get out and do things in her yard that she has not been able to do in years.      Objective   Vital Signs:   There were no vitals taken for this visit.    Physical Exam  60 y.o. female is awake, alert, oriented, in no acute distress and well developed, well nourished.  Ortho Exam   Left knee  There is moderate joint line tenderness at the medial aspect of the left knee.  Positive for varus orientation of the knee.   Positive for crepitus throughout range of motion.   Negative for effusion.  Positive patellar grind test.   Negative Lachman test.    Negative anterior and posterior drawer.  Range of motion in extension and flexion is: 0-110 degrees.  Neurovascular status is intact.    Dorsalis pedis and posterior tibial artery pulses are palpable.    Common peroneal nerve function is well preserved.  Gait is cautious and antalgic.          PROCEDURE  Procedures   See separate procedure note    Injection site was identified by physical examination and cleaned with Betadine and alcohol swabs. Prior to needle insertion, ethyl chloride spray was used for surface anesthesia. Sterile technique was used.       Assessment   Assessment and Plan    Problem List Items Addressed This Visit        Musculoskeletal and Injuries    Primary osteoarthritis of left knee - Primary    Relevant Orders    Large Joint Arthrocentesis: L knee    Visco Treatment          Follow Up   • Left knee Orthovisc (#3 of 3) injection was discussed with the patient.  Verbal consent was given to proceed with the procedure. Injections  Pulmonology performed from anteromedial approach.  Patient tolerated the procedure well and no complications were encountered.  • Discussion of orthopedic goals and activities and patient/guardian expressed understanding.  • Ice, heat, rest, compression and elevation of extremity as beneficial  • nsaids and/or tylenol as beneficial  • Instructed to refrain from heavy activity/rest the extremity for the next 24-48 hours  • Discussion regarding possibility of cortisol flare and what to expect if this occurs  • Watch for signs and symptoms of infection  • Call if adverse effect from injection therapy  • Follow up in 6 months for Orthovisc series  • Patient was given instructions and counseling regarding her condition or for health maintenance advice. Please see specific information pulled into the AVS if appropriate.     Mamadou Tubbs PA-C   Date of Encounter: 5/20/2022     Electronically signed by Mamadou Tubbs PA-C, 05/20/22, 9:54 AM EDT.    EMR Dragon/Transcription disclaimer:  Much of this encounter note is an electronic transcription/translation of spoken language to printed text. The electronic translation of spoken language may permit erroneous, or at times, nonsensical words or phrases to be inadvertently transcribed; Although I have reviewed the note for such errors, some may still exist.

## 2022-08-03 ENCOUNTER — CLINICAL SUPPORT (OUTPATIENT)
Dept: ORTHOPEDIC SURGERY | Facility: CLINIC | Age: 62
End: 2022-08-03

## 2022-08-03 VITALS — HEIGHT: 68 IN | WEIGHT: 198 LBS | BODY MASS INDEX: 30.01 KG/M2 | TEMPERATURE: 98.5 F

## 2022-08-03 DIAGNOSIS — M17.11 PRIMARY OSTEOARTHRITIS OF RIGHT KNEE: Primary | ICD-10-CM

## 2022-08-03 PROCEDURE — 20610 DRAIN/INJ JOINT/BURSA W/O US: CPT | Performed by: PHYSICIAN ASSISTANT

## 2022-08-03 RX ADMIN — METHYLPREDNISOLONE ACETATE 160 MG: 80 INJECTION, SUSPENSION INTRA-ARTICULAR; INTRALESIONAL; INTRAMUSCULAR; SOFT TISSUE at 11:15

## 2022-08-03 NOTE — PROGRESS NOTES
"Chief Complaint  Follow-up and Pain of the Right Knee    Subjective    History of Present Illness      Rain Camarena is a 61 y.o. female who presents to [facility] for injection therapy. She receives  RIGHT knee injections of Depo-Medrol. Since last injection, patient notes mild-moderate relief of symptoms. She has also received an orthovisc series in the opposite knee and it is working great. She wants to try it on the right knee when she gets her next series.Treatment options have been discussed and she understands and consents.       Objective   Vital Signs:   Temp 98.5 °F (36.9 °C)   Ht 171.5 cm (67.5\")   Wt 89.8 kg (198 lb)   BMI 30.55 kg/m²     Physical Exam  61 y.o. female is awake, alert, oriented, in no acute distress and well developed, well nourished.  Ortho Exam   RIGHT knee  There is moderate joint line tenderness at the medial aspect of the left knee and mild at the right knee.  Positive for varus orientation of the knee.   Positive for crepitus throughout range of motion.   Negative for effusion.  Positive patellar grind test.   Negative Lachman test.    Negative anterior and posterior drawer.  Range of motion in extension and flexion is: 0-110 degrees.  Neurovascular status is intact.    Dorsalis pedis and posterior tibial artery pulses are palpable.    Common peroneal nerve function is well preserved.  Gait is cautious and antalgic.        Result Review :           PROCEDURE  Large Joint Arthrocentesis: R knee  Date/Time: 8/3/2022 11:15 AM  Consent given by: patient  Site marked: site marked  Timeout: Immediately prior to procedure a time out was called to verify the correct patient, procedure, equipment, support staff and site/side marked as required   Supporting Documentation  Indications: pain   Procedure Details  Location: knee - R knee  Preparation: Patient was prepped and draped in the usual sterile fashion  Needle size: 25 G  Approach: " anteromedial  Medications administered: 160 mg methylPREDNISolone acetate 80 MG/ML; 2 mL lidocaine (cardiac)  Patient tolerance: patient tolerated the procedure well with no immediate complications             Injection site was identified by physical examination and cleaned with Betadine and alcohol swabs. Prior to needle insertion, ethyl chloride spray was used for surface anesthesia. Sterile technique was used.       Assessment   Assessment and Plan    Diagnoses and all orders for this visit:    1. Primary osteoarthritis of right knee (Primary)  -     Cancel: Large Joint Arthrocentesis: R knee  -     Cancel: Large Joint Arthrocentesis: L knee  -     Visco Treatment; Future    Other orders  -     Cancel: Visco Treatment; Future  -     Cancel: Visco Treatment; Future  -     Cancel: Large Joint Arthrocentesis  -     Large Joint Arthrocentesis         Follow Up   • Right knee Depo-Medrol injection was discussed with the patient. Discussed indication, risks, benefits, and alternatives. Verbal consent was given to proceed with the procedure.   • Injection was performed from anteromedial approach.  Patient tolerated the procedure well and no complications were encountered.  • Discussion of orthopedic goals and activities and patient/guardian expressed understanding.  • Ice, heat, rest, compression and elevation of extremity as beneficial  • nsaids and/or tylenol as beneficial  • Instructed to refrain from heavy activity/rest the extremity for the next 24-48 hours  • Discussion regarding possibility of cortisol flare and what to expect if this occurs  • Watch for signs and symptoms of infection  • Call if adverse effect from injection therapy  • Follow up in 4 months/as scheduled for orthovisc  • Patient was given instructions and counseling regarding her condition or for health maintenance advice. Please see specific information pulled into the AVS if appropriate.     Mamadou Tubbs PA-C   Date of Encounter:  8/3/2022   Electronically signed by Mamadou Tubbs PA-C, 08/03/22, 11:37 AM EDT.   Electronically signed by Mamadou Tubbs PA-C, 08/10/22, 6:23 AM EDT.     EMR Dragon/Transcription disclaimer:  Much of this encounter note is an electronic transcription/translation of spoken language to printed text. The electronic translation of spoken language may permit erroneous, or at times, nonsensical words or phrases to be inadvertently transcribed; Although I have reviewed the note for such errors, some may still exist.

## 2022-08-10 RX ORDER — METHYLPREDNISOLONE ACETATE 80 MG/ML
160 INJECTION, SUSPENSION INTRA-ARTICULAR; INTRALESIONAL; INTRAMUSCULAR; SOFT TISSUE
Status: COMPLETED | OUTPATIENT
Start: 2022-08-03 | End: 2022-08-03

## 2022-11-29 ENCOUNTER — TELEPHONE (OUTPATIENT)
Dept: ORTHOPEDIC SURGERY | Facility: CLINIC | Age: 62
End: 2022-11-29

## 2022-11-29 NOTE — TELEPHONE ENCOUNTER
Caller: PATIENT    Relationship to patient: SELF     Best call back number: [phone]    Patient is needing: PATIENT WAS CALLING TO DISCUSS HER INSURANCE COMPANY SENDING A LETTER STATING HER INJECTION WAS NOT APPROVED DUE TO THE PROVIDER NOT PROVIDING THE INFORMATION THAT THEY REQUESTED. PATIENT HAS AN APPT SCHEDULED WITH [facility] FOR TOMORROW TO GET AN INJECTION. I ATTEMPTED TO WARM TRANSFER CALL TO THE OFFICE TO DISCUSS THIS BUT RECEIVED NO ANSWER. PATIENT IS REQUESTING A CALL BACK AFTER 10:30 AM. THANK YOU!

## 2022-11-29 NOTE — TELEPHONE ENCOUNTER
CALLED INS. ORTHOVISC DENIED, [facility]REF[facility], ANSWERED CLINICAL QUESTIONS. WILL BE GIVEN DENIAL OR APPROVAL WITHING 24 TO 72 HOURS. CALLED PT. AND EXPLAINED WHAT INS. SAID HB

## 2022-11-29 NOTE — TELEPHONE ENCOUNTER
Caller: EFREM GA    Relationship to patient: SELF    Best call back number:   [phone]    Patient is needing: UNABLE TO WARM TRANSFER.  PATIENT IS CALLING IN REGARDS TO DENIAL FROM HER INS FOR PROCEDURE TOMORROW.  PLEASE CONTACT HER TODAY

## 2022-12-06 ENCOUNTER — TELEPHONE (OUTPATIENT)
Dept: ORTHOPEDIC SURGERY | Facility: CLINIC | Age: 62
End: 2022-12-06

## 2022-12-06 DIAGNOSIS — M17.11 PRIMARY OSTEOARTHRITIS OF RIGHT KNEE: Primary | ICD-10-CM

## 2022-12-06 NOTE — TELEPHONE ENCOUNTER
Called pt. To let her know we her insurance is spec. Pharmacy only with Gel injection. We will have to reschedule appt. To allow time for shipping of gel. Pt. Was  Very upset that the only appointment we had open was Jan 6 2023. I explained and apologized that our providers booked until that date. The pt. Stated she would keep her appt. For tomorrow and get the steroid injection. She did not want to get the gel injection and she would speak with Mamadou about it at her appointment tomorrow. HB

## 2022-12-07 ENCOUNTER — TELEPHONE (OUTPATIENT)
Dept: ORTHOPEDIC SURGERY | Facility: CLINIC | Age: 62
End: 2022-12-07

## 2022-12-13 ENCOUNTER — TELEPHONE (OUTPATIENT)
Dept: ORTHOPEDIC SURGERY | Facility: CLINIC | Age: 62
End: 2022-12-13

## 2022-12-13 NOTE — TELEPHONE ENCOUNTER
"Provider: LYNNE SHORT    Caller: EFREM GA    Relationship to Patient: SELF     Phone Number: [phone]    Reason for Call:  PT WOULD LIKE A CALL BACK TO DISCUSS \"PATIENT MESSAGE\" SHE SENT IN THIS MORNING AT 8:52 & 11:05 IN REGARDS TO [facility]   "
normal...

## 2022-12-15 ENCOUNTER — TELEPHONE (OUTPATIENT)
Dept: ORTHOPEDIC SURGERY | Facility: CLINIC | Age: 62
End: 2022-12-15

## 2022-12-15 NOTE — TELEPHONE ENCOUNTER
Relationship to Patient: [facility]     Phone Number: [phone]     Reason for Call: ATTEMPTED TO WARM TRANSFER, [facility] STATES THEY NEED TO SPEAK TO SOMEONE ABOUT THE PATIENTS DUROLANE INJECTION DUE TO NOT BEING ABLE TO FILL THE REQUEST UNTIL THEY SPEAK TO SOMEONE IN THE OFFICE.

## 2022-12-16 NOTE — TELEPHONE ENCOUNTER
I CALLED [facility] BACK AND SPOKE WITH SHMUEL. I WENT AHEAD AND REQUESTED THE DURALANE FOR BILATERAL KNEES INSTEAD OF JUST THE RIGHT BC I FEEL LIKE AFTER LOOKING AT THE FACESHEET THAT IT NEEDS TO BE BOTH KNEES. SHMUEL STATES THAT EVEN IF IT IS FOR JUST THE RIGHT KNEE WE CAN STORE THE DUROLANE IN THE [place] AND USE FOR THE NEXT TIME/RJ

## 2022-12-21 ENCOUNTER — CLINICAL SUPPORT (OUTPATIENT)
Dept: ORTHOPEDIC SURGERY | Facility: CLINIC | Age: 62
End: 2022-12-21

## 2022-12-21 VITALS — HEIGHT: 55 IN | TEMPERATURE: 98.5 F | WEIGHT: 198 LBS | BODY MASS INDEX: 45.82 KG/M2

## 2022-12-21 DIAGNOSIS — M17.12 PRIMARY OSTEOARTHRITIS OF LEFT KNEE: ICD-10-CM

## 2022-12-21 DIAGNOSIS — M17.11 PRIMARY OSTEOARTHRITIS OF RIGHT KNEE: Primary | ICD-10-CM

## 2022-12-21 PROCEDURE — 20610 DRAIN/INJ JOINT/BURSA W/O US: CPT | Performed by: PHYSICIAN ASSISTANT

## 2022-12-21 RX ORDER — LIDOCAINE HYDROCHLORIDE 20 MG/ML
2 INJECTION, SOLUTION EPIDURAL; INFILTRATION; INTRACAUDAL; PERINEURAL
Status: COMPLETED | OUTPATIENT
Start: 2022-12-21 | End: 2022-12-21

## 2022-12-21 RX ADMIN — LIDOCAINE HYDROCHLORIDE 2 ML: 20 INJECTION, SOLUTION EPIDURAL; INFILTRATION; INTRACAUDAL; PERINEURAL at 14:23

## 2022-12-21 NOTE — PROGRESS NOTES
"Chief Complaint  Follow-up and Pain of the Right Knee (DUROLANE) and Follow-up and Pain of the Left Knee (DUROLANE)    Subjective    History of Present Illness      Rain Camarena is a 62 y.o. female who presents to [facility] for injection therapy. She receives  BILATERAL knee injections of Depo-Medrol  she was receiving orthovisc in the left knee but now insurance is requiring us to try durolane.  The Durolane was delivered to our office through specialty pharmacy.  She also brought letters from [facility] showing approval for Durolane but also showing approval for Monovisc, although in our system we have a denial for Monovisc.  Treatment options have been discussed and she understands and consents.       Objective   Vital Signs:   Temp 98.5 °F (36.9 °C)   Ht 67.5 cm (26.58\")   Wt 89.8 kg (198 lb)   .12 kg/m²     Physical Exam  62 y.o. female is awake, alert, oriented, in no acute distress and well developed, well nourished.  Ortho Exam   BILATERAL knee  There is moderate joint line tenderness at the medial aspect of the left knee and mild at the right knee.  Positive for varus orientation of the knee.   Positive for crepitus throughout range of motion.   Negative for effusion.  Positive patellar grind test.   Negative Lachman test.    Negative anterior and posterior drawer.  Range of motion in extension and flexion is: 0-110 degrees.  Neurovascular status is intact.    Dorsalis pedis and posterior tibial artery pulses are palpable.    Common peroneal nerve function is well preserved.  Gait is cautious and antalgic.        Result Review :           PROCEDURE  - Large Joint Arthrocentesis: bilateral knee on 12/21/2022 2:23 PM  Indications: pain  Details: 25 G needle, anteromedial approach  Medications (Right): 60 mg Sodium Hyaluronate 60 MG/3ML; 2 mL lidocaine PF 2% 2 %  Medications (Left): 2 mL lidocaine PF 2% 2 %; 60 mg Sodium Hyaluronate 60 MG/3ML  Outcome: tolerated well, no " immediate complications  Procedure, treatment alternatives, risks and benefits explained, specific risks discussed. Immediately prior to procedure a time out was called to verify the correct patient, procedure, equipment, support staff and site/side marked as required. Patient was prepped and draped in the usual sterile fashion.       Injection site was identified by physical examination and cleaned with Betadine and alcohol swabs. Prior to needle insertion, ethyl chloride spray was used for surface anesthesia. Sterile technique was used.       Assessment   Assessment and Plan    Diagnoses and all orders for this visit:    1. Primary osteoarthritis of right knee (Primary)  -     - Large Joint Arthrocentesis: bilateral knee  -     Visco Treatment; Future    2. Primary osteoarthritis of left knee  -     - Large Joint Arthrocentesis: bilateral knee  -     Visco Treatment; Future         Follow Up   • Bilateral knee Durolane injection was discussed with the patient. Discussed indication, risks, benefits, and alternatives. Verbal consent was given to proceed with the procedure.   • Injection was performed from anteromedial approach.  Patient tolerated the procedure well and no complications were encountered.  • Discussion of orthopedic goals and activities and patient/guardian expressed understanding.  • Ice, heat, rest, compression and elevation of extremity as beneficial  • nsaids and/or tylenol as beneficial  • Instructed to refrain from heavy activity/rest the extremity for the next 24-48 hours  • Discussion regarding possibility of cortisol flare and what to expect if this occurs  • Watch for signs and symptoms of infection  • Call if adverse effect from injection therapy  • Follow up in 6 months for visco  • Patient was given instructions and counseling regarding her condition or for health maintenance advice. Please see specific information pulled into the AVS if appropriate.     Mamadou Tubbs PA-C   Date  of Encounter: 12/21/2022   Electronically signed by Mamadou Tubbs PA-C, 12/21/22, 2:37 PM EST.     EMR Dragon/Transcription disclaimer:  Much of this encounter note is an electronic transcription/translation of spoken language to printed text. The electronic translation of spoken language may permit erroneous, or at times, nonsensical words or phrases to be inadvertently transcribed; Although I have reviewed the note for such errors, some may still exist.

## 2023-01-27 ENCOUNTER — TELEPHONE (OUTPATIENT)
Dept: ORTHOPEDIC SURGERY | Facility: CLINIC | Age: 63
End: 2023-01-27
Payer: COMMERCIAL

## 2023-01-27 NOTE — TELEPHONE ENCOUNTER
Pt. Called stating she has had no relief from Durolane injection. She had better relief with the Orthovisc injection. She would like to try the Orthovisc again.

## 2023-01-27 NOTE — TELEPHONE ENCOUNTER
Provider: HAN  Caller: AMANDO    Pharmacy: [facility]  Phone Number: [phone]  Reason for Call: CALLED TODAY TO TELL US THAT THE PT WAS CONFUSED AS TO WHY THEY HAVE THE PRESCRIPTION ORDER FOR THE INJ. SHE STATED PT WAS HESITANT ABOUT THE INJECTION NOW BUT SHE REASSURED THE PT THAT INJ DID GO THROUGH HER INS. SHE WANTED TO HAVE SOMEONE CALL THE PT TO LET HER KNOW THE PROCESS OR HOW THEY GOT THE ORDER. SHE SAID THE PT WAS CONFUSED AS TO WHY SHE HAD AN RX FOR THE INJ WHEN SHE GOT IT INJ December AND WHY IT WASN'T [facility]

## 2023-03-06 ENCOUNTER — TELEPHONE (OUTPATIENT)
Dept: ORTHOPEDIC SURGERY | Facility: CLINIC | Age: 63
End: 2023-03-06
Payer: COMMERCIAL

## 2023-03-06 ENCOUNTER — HOSPITAL ENCOUNTER (OUTPATIENT)
Dept: GENERAL RADIOLOGY | Facility: HOSPITAL | Age: 63
Discharge: HOME | End: 2023-03-06
Admitting: PHYSICIAN ASSISTANT
Payer: COMMERCIAL

## 2023-03-06 DIAGNOSIS — M17.12 PRIMARY OSTEOARTHRITIS OF LEFT KNEE: ICD-10-CM

## 2023-03-06 DIAGNOSIS — M17.11 PRIMARY OSTEOARTHRITIS OF RIGHT KNEE: ICD-10-CM

## 2023-03-06 DIAGNOSIS — M17.11 PRIMARY OSTEOARTHRITIS OF RIGHT KNEE: Primary | ICD-10-CM

## 2023-03-06 PROCEDURE — 73562 X-RAY EXAM OF KNEE 3: CPT

## 2023-03-06 NOTE — TELEPHONE ENCOUNTER
[facility]  [facility]MENT HAS BEEN SCHEDULED

## 2023-03-06 NOTE — TELEPHONE ENCOUNTER
Provider: LYNNE SHORT  Caller: EFREM GA  Relationship to Patient: SELF  Phone Number: [phone]  Reason for Call: PATIENT CALLED WANTING TO SCHEDULE BILATERAL KNEE CORTISONE INJECTIONS. PLEASE ADVISE.

## 2023-03-08 NOTE — PROGRESS NOTES
Left knee without much change from prior xrays. Right knee with some progression from last xrays. Moderate arthritis throughout both knees, just more so on the right.

## 2023-03-13 ENCOUNTER — CLINICAL SUPPORT (OUTPATIENT)
Dept: ORTHOPEDIC SURGERY | Facility: CLINIC | Age: 63
End: 2023-03-13
Payer: COMMERCIAL

## 2023-03-13 VITALS — WEIGHT: 200 LBS | HEIGHT: 68 IN | BODY MASS INDEX: 30.31 KG/M2 | TEMPERATURE: 98.2 F

## 2023-03-13 DIAGNOSIS — M17.12 PRIMARY OSTEOARTHRITIS OF LEFT KNEE: ICD-10-CM

## 2023-03-13 DIAGNOSIS — M17.11 PRIMARY OSTEOARTHRITIS OF RIGHT KNEE: Primary | ICD-10-CM

## 2023-03-13 PROBLEM — E66.9 OBESITY (BMI 30.0-34.9): Status: ACTIVE | Noted: 2023-03-13

## 2023-03-13 PROCEDURE — 99213 OFFICE O/P EST LOW 20 MIN: CPT | Performed by: PHYSICIAN ASSISTANT

## 2023-03-13 PROCEDURE — 20610 DRAIN/INJ JOINT/BURSA W/O US: CPT | Performed by: PHYSICIAN ASSISTANT

## 2023-03-13 RX ORDER — PREDNISONE 20 MG/1
TABLET ORAL
Qty: 15 TABLET | Refills: 0 | Status: SHIPPED | OUTPATIENT
Start: 2023-03-13

## 2023-03-13 RX ORDER — LIDOCAINE HYDROCHLORIDE 10 MG/ML
2 INJECTION, SOLUTION EPIDURAL; INFILTRATION; INTRACAUDAL; PERINEURAL
Status: COMPLETED | OUTPATIENT
Start: 2023-03-13 | End: 2023-03-13

## 2023-03-13 RX ORDER — METHYLPREDNISOLONE ACETATE 80 MG/ML
160 INJECTION, SUSPENSION INTRA-ARTICULAR; INTRALESIONAL; INTRAMUSCULAR; SOFT TISSUE
Status: COMPLETED | OUTPATIENT
Start: 2023-03-13 | End: 2023-03-13

## 2023-03-13 RX ADMIN — LIDOCAINE HYDROCHLORIDE 2 ML: 10 INJECTION, SOLUTION EPIDURAL; INFILTRATION; INTRACAUDAL; PERINEURAL at 10:25

## 2023-03-13 RX ADMIN — METHYLPREDNISOLONE ACETATE 160 MG: 80 INJECTION, SUSPENSION INTRA-ARTICULAR; INTRALESIONAL; INTRAMUSCULAR; SOFT TISSUE at 10:25

## 2023-03-13 NOTE — PROGRESS NOTES
"Chief Complaint  Follow-up of the Left Knee and Follow-up of the Right Knee    Subjective    History of Present Illness      Rain Camarena is a 62 y.o. female who presents to [facility] for follow-up of bilateral knee pain, right greater than left.  Has been receiving bilateral knee injections for quite some time.  She typically  BILATERAL knee injections of Depo-Medrol. She has previously received Orthovisc with great results but then insurance required us to try durolane. The durolane provided no relief of symptoms.  She returns today with increased pain in the right knee.  She reports a fall onto the right knee back in the fall 2022 and since that time has had intermittent difficulties.  She reports at times if she straightens the leg it feels as if it locks in extension.  At times she is having to take an increase in medication, such as Tylenol, to alleviate the pain.      Objective   Vital Signs:   Temp 98.2 °F (36.8 °C)   Ht 172.7 cm (68\")   Wt 90.7 kg (200 lb)   BMI 30.41 kg/m²     Physical Exam  62 y.o. female is awake, alert, oriented, in no acute distress and well developed, well nourished.  Ortho Exam   BILATERAL knee  There is moderate joint line tenderness at the medial aspect of the left knee and mild at the right knee.  Positive for varus orientation of the knee.   Positive for crepitus throughout range of motion.   Negative for effusion.  Positive patellar grind test.   Negative Lachman test.    Negative anterior and posterior drawer.  Range of motion in extension and flexion is: 0-110 degrees.  Neurovascular status is intact.    Dorsalis pedis and posterior tibial artery pulses are palpable.    Common peroneal nerve function is well preserved.  Gait is cautious and antalgic.        Result Review :   BILATERAL knee xrays  weightbearing/standing 3 views were ordered by Mamadou Tubbs PA-C. Performed at [facility] on 3/6/23. Images were " independently viewed and interpreted by myself, my impression as follows:   Findings: Right knee: With obvious progression of narrowing of the medial and lateral joint spaces.  There is moderate tricompartmental osteoarthritis.  Left knee: Moderate narrowing of the medial joint space, moderate degenerative changes in the medial and patellofemoral compartments, mild degenerative changes to the lateral compartment.  Bony lesion: no  Soft tissues: within normal limits  Joint spaces: decreased  Hardware appropriately positioned: not applicable  Prior studies available for comparison: yes        PROCEDURE  - Large Joint Arthrocentesis: bilateral knee on 3/13/2023 10:25 AM  Indications: pain  Details: 25 G needle  Medications (Right): 2 mL lidocaine PF 1% 1 %; 160 mg methylPREDNISolone acetate 80 MG/ML  Medications (Left): 2 mL lidocaine PF 1% 1 %; 160 mg methylPREDNISolone acetate 80 MG/ML  Outcome: tolerated well, no immediate complications  Procedure, treatment alternatives, risks and benefits explained, specific risks discussed. Consent was given by the patient. Patient was prepped and draped in the usual sterile fashion.       Injection site was identified by physical examination and cleaned with Betadine and alcohol swabs. Prior to needle insertion, ethyl chloride spray was used for surface anesthesia. Sterile technique was used.       Assessment   Assessment and Plan    Diagnoses and all orders for this visit:    1. Primary osteoarthritis of right knee (Primary)  -     - Large Joint Arthrocentesis: bilateral knee  -     predniSONE (DELTASONE) 20 MG tablet; Take 1 tablet PO Bid x 5 days then 1 tablet PO QD x 5 days  Dispense: 15 tablet; Refill: 0    2. Primary osteoarthritis of left knee  -     - Large Joint Arthrocentesis: bilateral knee  -     predniSONE (DELTASONE) 20 MG tablet; Take 1 tablet PO Bid x 5 days then 1 tablet PO QD x 5 days  Dispense: 15 tablet; Refill: 0       I spent 20 minutes caring for Rain on  this date of service. This time includes time spent by me in the following activities:reviewing tests, performing a medically appropriate examination and/or evaluation , counseling and educating the patient/family/caregiver, ordering medications, tests, or procedures, documenting information in the medical record and independently interpreting results and communicating that information with the patient/family/caregiver   Follow Up   • Bilateral knee Durolane injection was discussed with the patient. Discussed indication, risks, benefits, and alternatives. Verbal consent was given to proceed with the procedure.   • Injection was performed from anteromedial approach.  Patient tolerated the procedure well and no complications were encountered.  • We did discuss possible MRI of the right knee if the steroid injection does not provide relief.  I have also sent in a prescription for prednisone in case she needs it in between now and next injection.  She reports she is going on a trip in about 4 to 5 weeks, but it would be too early to receive another injection.  • Discussion of orthopedic goals and activities and patient/guardian expressed understanding.  • Ice, heat, rest, compression and elevation of extremity as beneficial  • nsaids and/or tylenol as beneficial  • Instructed to refrain from heavy activity/rest the extremity for the next 24-48 hours  • Discussion regarding possibility of cortisol flare and what to expect if this occurs  • Watch for signs and symptoms of infection  • Call if adverse effect from injection therapy  • Follow up as scheduled in June for Visco supplementation, with Orthovisc if we can get approval  • Patient was given instructions and counseling regarding her condition or for health maintenance advice. Please see specific information pulled into the AVS if appropriate.     Mamadou Tubbs PA-C   Date of Encounter: 3/13/2023   Electronically signed by Mamadou Tubbs PA-C, 03/13/23,  12:28 PM EDT.      FERNANDO Dragon/Transcription disclaimer:  Much of this encounter note is an electronic transcription/translation of spoken language to printed text. The electronic translation of spoken language may permit erroneous, or at times, nonsensical words or phrases to be inadvertently transcribed; Although I have reviewed the note for such errors, some may still exist.

## 2023-05-01 ENCOUNTER — TELEPHONE (OUTPATIENT)
Dept: ORTHOPEDIC SURGERY | Facility: CLINIC | Age: 63
End: 2023-05-01
Payer: COMMERCIAL

## 2023-05-01 NOTE — TELEPHONE ENCOUNTER
[facility] CALLED STATED ORTHOVISC IS NOT COVERED UNDER PT. [facility]THE[facility] MEDICAL BENEFIT OR PHARMACY BENEFIT. THEY WILL CALL PT. AND OFFER SELF PAY OPTION. IF PT DOES NOT WANT THIS OPTION THEY WILL CALL THE OFFICE TO INFORM HB

## 2023-07-24 ENCOUNTER — HOSPITAL ENCOUNTER (OUTPATIENT)
Dept: MRI IMAGING | Facility: HOSPITAL | Age: 63
Discharge: HOME | End: 2023-07-24
Admitting: PHYSICIAN ASSISTANT
Payer: COMMERCIAL

## 2023-07-24 DIAGNOSIS — M17.11 PRIMARY OSTEOARTHRITIS OF RIGHT KNEE: ICD-10-CM

## 2023-07-24 PROCEDURE — 73721 MRI JNT OF LWR EXTRE W/O DYE: CPT

## 2023-07-26 NOTE — PROGRESS NOTES
Good morning! Your MRI shows degenerative tearing of the meniscus on the lateral (outside) of the knee. This is not an acute finding and is something that has occurred over time with the arthritis. There is grade 4 (4 is the worst) arthritis changes in this same area of the knee. There is grade 3-4 arthritis in the inside (medial) of the knee. There is grade 2-3 arthritis changes in the knee cap region. This is knee has worsened in comparison to MRI in 2020. You are certainly a candidate for the knee to be replaced if the gel does not do great.

## 2023-07-31 ENCOUNTER — PREP FOR SURGERY (OUTPATIENT)
Dept: OTHER | Facility: HOSPITAL | Age: 63
End: 2023-07-31
Payer: COMMERCIAL

## 2023-07-31 DIAGNOSIS — M17.11 PRIMARY OSTEOARTHRITIS OF RIGHT KNEE: Primary | ICD-10-CM

## 2023-08-01 RX ORDER — ACETAMINOPHEN 10 MG/ML
1000 INJECTION, SOLUTION INTRAVENOUS ONCE
OUTPATIENT
Start: 2023-08-01 | End: 2023-08-01

## 2023-08-01 RX ORDER — MELOXICAM 15 MG/1
15 TABLET ORAL ONCE
OUTPATIENT
Start: 2023-08-01 | End: 2023-08-01

## 2023-08-01 RX ORDER — PREGABALIN 75 MG/1
150 CAPSULE ORAL ONCE
OUTPATIENT
Start: 2023-08-01 | End: 2023-08-01

## 2023-08-01 RX ORDER — CHLORHEXIDINE GLUCONATE 500 MG/1
CLOTH TOPICAL TAKE AS DIRECTED
OUTPATIENT
Start: 2023-08-01

## 2023-08-01 RX ORDER — CEFAZOLIN SODIUM 2 G/100ML
2 INJECTION, SOLUTION INTRAVENOUS ONCE
OUTPATIENT
Start: 2023-08-01 | End: 2023-08-01

## 2023-10-19 ENCOUNTER — HOSPITAL ENCOUNTER (OUTPATIENT)
Dept: GENERAL RADIOLOGY | Facility: HOSPITAL | Age: 63
Discharge: HOME | End: 2023-10-19
Admitting: PHYSICIAN ASSISTANT
Payer: COMMERCIAL

## 2023-10-19 ENCOUNTER — OFFICE VISIT (OUTPATIENT)
Dept: ORTHOPEDIC SURGERY | Facility: CLINIC | Age: 63
End: 2023-10-19
Payer: COMMERCIAL

## 2023-10-19 VITALS — HEIGHT: 68 IN | BODY MASS INDEX: 30.22 KG/M2 | WEIGHT: 199.4 LBS | TEMPERATURE: 97.8 F

## 2023-10-19 DIAGNOSIS — M17.0 PRIMARY OSTEOARTHRITIS OF BOTH KNEES: Primary | ICD-10-CM

## 2023-10-19 DIAGNOSIS — M17.11 PRIMARY OSTEOARTHRITIS OF RIGHT KNEE: ICD-10-CM

## 2023-10-19 PROBLEM — N95.1 MENOPAUSAL SYMPTOM: Status: ACTIVE | Noted: 2020-09-15

## 2023-10-19 PROBLEM — M19.90 ARTHRITIS: Status: ACTIVE | Noted: 2017-10-17

## 2023-10-19 PROBLEM — K64.9 BLEEDING HEMORRHOIDS: Status: ACTIVE | Noted: 2023-01-17

## 2023-10-19 PROBLEM — M54.2 PAIN OF CERVICAL SPINE: Status: ACTIVE | Noted: 2018-04-26

## 2023-10-19 PROBLEM — M25.579 ACUTE ANKLE PAIN: Status: ACTIVE | Noted: 2020-09-26

## 2023-10-19 PROBLEM — Z90.710 ABSENCE OF BOTH CERVIX AND UTERUS, ACQUIRED: Status: ACTIVE | Noted: 2021-12-06

## 2023-10-19 PROBLEM — M48.062 SPINAL STENOSIS OF LUMBAR REGION WITH NEUROGENIC CLAUDICATION: Status: ACTIVE | Noted: 2023-04-11

## 2023-10-19 PROBLEM — L85.8 KERATOACANTHOMA: Status: ACTIVE | Noted: 2021-12-13

## 2023-10-19 PROCEDURE — 73560 X-RAY EXAM OF KNEE 1 OR 2: CPT

## 2023-10-19 PROCEDURE — 99214 OFFICE O/P EST MOD 30 MIN: CPT | Performed by: ORTHOPAEDIC SURGERY

## 2023-10-19 NOTE — PROGRESS NOTES
"Chief Complaint  Follow-up and Pain of the Right Knee (Discuss Surgery)    Subjective    History of Present Illness      Rain Camarena is a 63 y.o. female who presents to [facility] for preoperative conference for right total knee arthroplasty.  History of Present Illness this patient has a very longstanding history of the knee pain and arthritis.  We have treated her for at least 10 years with pain medication and anti-inflammatory medication.  She has also received intra-articular injections over several months and years.  She states that she wants a better quality of life.  She wants to be active and at this point is unable to get outside the house and walk for exercise.  She would like to proceed with knee replacement surgery and I certainly understand and accept that.  She has been cleared by the primary care physician and we are coordinating the care to proceed with total knee arthroplasty.  Pain Location:  RIGHT knee  Radiation: none  Quality: dull, aching  Intensity/Severity: moderate to severe  Duration:  several years  Progression of symptoms: yes, progressive worsening  Onset quality: gradual   Timing: constant  Aggravating Factors: going up and down stairs, kneeling, rising after sitting  Alleviating Factors: Tylenol, steroid injection (intra-articular), Visco supplementation injection, rest  Previous Episodes: yes  Associated Symptoms: pain, clicking/popping, decreased ROM, decreased strength  ADLs Affected: dressing, ambulating, recreational activities/sports  Previous Treatment: Tylenol and intra-articular injection       Objective   Vital Signs:   Temp 97.8 °F (36.6 °C)   Ht 172.7 cm (68\")   Wt 90.4 kg (199 lb 6.4 oz)   BMI 30.32 kg/m²     Physical Exam  Physical Exam  Vitals signs and nursing note reviewed.   Constitutional:       Appearance: Normal appearance.   Pulmonary:      Effort: Pulmonary effort is normal.   Skin:     General: Skin is warm and dry.      " Capillary Refill: Capillary refill takes less than 2 seconds.   Neurological:      General: No focal deficit present.      Mental Status: He is alert and oriented to person, place, and time. Mental status is at baseline.   Psychiatric:         Mood and Affect: Mood normal.         Behavior: Behavior normal.         Thought Content: Thought content normal.         Judgment: Judgment normal.     Ortho Exam   Right knee (varus). Patient has crepitus throughout range of motion. Positive patellar grind test. Mild effusion. Lachman is negative. Pivot shift is negative. Anterior and posterior drawer signs are negative. Significant joint line tenderness is noted on the medial aspect of the knee. Patient has a varus orientation of the knee. There is fullness and tenderness in the Popliteal fossa. Mild distention of a Popliteal cyst is noted in this location. Range of motion in flexion is from 0-110 degrees. Neurovascular status is intact.  Dorsalis pedis and posterior tibial artery pulses are palpable. Common peroneal nerve function is well preserved. Patient's gait is cautious and antalgic. Skin and soft tissues are mildly swollen, consistent with synovitis and effusion. The patient has a significant limp with the first few steps after starting the gait cycle. Getting out of a chair takes a lot of effort due to pain on knee flexion.       Result Review :  The following data was reviewed by: Kamar Alcantar MD on 10/19/2023:  Radiologic studies - see below for interpretation  RIGHT knee xrays  weightbearing/standing ap/lateral views were ordered by Kamar Alcantar MD. Performed at [facility] on 10/19/2023. Images were independently viewed and interpreted by myself, my impression as follows:    right Knee X-Ray  Indication: Evaluation of pain and discomfort on the medial aspect of the knee.  AP, Lateral views  Findings: Moderately advanced degenerative osteoarthritis with significant narrowing of the medial  joint space.  Osteophyte formation is noted.  no bony lesion  Soft tissues within normal limits  decreased joint spaces  Hardware appropriately positioned not applicable      no prior studies available for comparison.    This patient's x-ray report was graded according to the Kellgren and Abel classification.  This took into account the joint space narrowing, osteophyte formation, sclerosis of the distal femur/proximal tibia along with deformity of those bones.  The findings were indicative of K L grade 3.    X-RAY was ordered and reviewed by Kamar Alcantar MD       Reviewed MRI report of right knee, performed at  [facility] on 07/24/2023, summary of impression below:    Previously obtained MRI was reviewed.  Subchondral cyst formation is noted.  There is subchondral edema over the distal aspect of the femoral bone.  Flap tears of the meniscus are noted as well.        Procedures           Assessment   Assessment and Plan    Diagnoses and all orders for this visit:    1. Primary osteoarthritis of both knees (Primary)          Follow Up   Compression/brace to prevent knee from buckling and giving out.  Extensive to the patient.  Attempt potential complications of surgical procedure patient had several questions which were answered.  Her  is also present in the office today.  We had a discussion to the extent of 45 minutes in the office today.  Rest, ice, compression, and elevation (RICE) therapy  Stretching and strengthening exercises  OTC Tylenol 500-1000mg by mouth every 6 hours as needed for pain   Patient is scheduled for Right Total Knee Arthroplasty on 12/08/23  Patient was given instructions and counseling regarding her condition or for health maintenance advice. Please see specific information pulled into the AVS if appropriate.   The patient was seen today for preoperative discussion.  The patient has been tried on over-the-counter and prescription NSAID's despite the risks of  anti-inflammatory bleeding, peptic ulcers and erosive gastritis with short term benefit only.  Braces have been prescribed for mechanical support.  Patient has been participating in an exercise program specifically targeting joint pain relief with limited benefit. Intraarticular injections have been used periodically with some but not complete relief of pain.  Ambulation aids have also been utilized.      The details of the surgical procedure were explained including the location of probable incisions and a description of the likely hardware/grafts to be used. The patient understands the likely convalescence after surgery as well as the rehabilitation required.  Also, we have thoroughly discussed with the patient the risks, benefits and alternatives to surgery.  Risks include but are not limited to the risk of infection, joint stiffness, limited range of motion, wound healing problems, scar tissue build up, myocardial infarction, stroke, blood clots (including DVT and/or pulmonary embolus along with the risk of death) neurologic and/or vascular injury, limb length discrepancy, fracture, dislocation, nonunion, malunion, continued pain and need for further surgery including hardware failure requiring revision.      Kamar Alcantar MD   Date of Encounter: 10/19/2023   Electronically signed by Kamar Alcantar MD, 10/19/23, 9:55 AM EDT.     EMR Dragon/Transcription disclaimer:  Much of this encounter note is an electronic transcription/translation of spoken language to printed text. The electronic translation of spoken language may permit erroneous, or at times, nonsensical words or phrases to be inadvertently transcribed; Although I have reviewed the note for such errors, some may still exist.

## 2023-10-26 ENCOUNTER — TELEPHONE (OUTPATIENT)
Dept: ORTHOPEDIC SURGERY | Facility: CLINIC | Age: 63
End: 2023-10-26

## 2023-10-26 NOTE — TELEPHONE ENCOUNTER
"  Caller: Rain Camarena SANDRA \"Yesica\"    Relationship: Self    Best call back number: [phone]    What is the best time to reach you:  THIS AFTERNOON OR TOMORROW BETWEEN 12 - 1:30 OR AFTER 2:30 PM     Who are you requesting to speak with (clinical staff, provider,  specific staff member): CLINICAL STAFF     Do you know the name of the person who called: PATIENT NEEDS A CALL BACK     What was the call regarding: PATIENT WOULD LIKE TO CONFIRM THE DATES, TIMES AND LOCATIONS OF HER PRE-OP APPTS AS WELL AS ANY OTHER UPCOMING APPOINTMENTS. PLEASE CALL BACK TO DISCUSS     Is it okay if the provider responds through MyChart: NO         "

## 2023-11-14 ENCOUNTER — TELEPHONE (OUTPATIENT)
Dept: ORTHOPEDIC SURGERY | Facility: CLINIC | Age: 63
End: 2023-11-14

## 2023-11-14 NOTE — TELEPHONE ENCOUNTER
Provider: DR KIM    Caller: SUSAN GA    Relationship to Patient: SELF    Phone Number: [phone]    Reason for Call: PT HAS SURGERY SCHEDULED WITH DR KIM ON 12/8, AND SHE WAS RESCHEDULED FOR HER EPIDURALS ON 11/27 DUE TO ACCIDENTALLY CANCELING HER ORIGINAL APPT. SHE WANTS TO KNOW IF THIS WILL BE ALL RIGHT TO HAVE THE EPIDURALS THAT CLOSE TO HER SURGERY DATE. PLEASE CALL HER BACK AT THE NUMBER ABOVE.

## 2023-12-01 ENCOUNTER — PRE-ADMISSION TESTING (OUTPATIENT)
Dept: PREADMISSION TESTING | Facility: HOSPITAL | Age: 63
End: 2023-12-01
Payer: COMMERCIAL

## 2023-12-01 VITALS
OXYGEN SATURATION: 98 % | DIASTOLIC BLOOD PRESSURE: 76 MMHG | WEIGHT: 205 LBS | SYSTOLIC BLOOD PRESSURE: 129 MMHG | RESPIRATION RATE: 16 BRPM | TEMPERATURE: 98 F | BODY MASS INDEX: 32.18 KG/M2 | HEIGHT: 67 IN | HEART RATE: 73 BPM

## 2023-12-01 DIAGNOSIS — M17.11 PRIMARY OSTEOARTHRITIS OF RIGHT KNEE: ICD-10-CM

## 2023-12-01 LAB
ABO GROUP BLD: NORMAL
ANION GAP SERPL CALCULATED.3IONS-SCNC: 6.9 MMOL/L (ref 5–15)
BLD GP AB SCN SERPL QL: NEGATIVE
BUN SERPL-MCNC: 16 MG/DL (ref 8–23)
BUN/CREAT SERPL: 25 (ref 7–25)
CALCIUM SPEC-SCNC: 9.5 MG/DL (ref 8.6–10.5)
CHLORIDE SERPL-SCNC: 104 MMOL/L (ref 98–107)
CO2 SERPL-SCNC: 29.1 MMOL/L (ref 22–29)
CREAT SERPL-MCNC: 0.64 MG/DL (ref 0.57–1)
DEPRECATED RDW RBC AUTO: 42.7 FL (ref 37–54)
EGFRCR SERPLBLD CKD-EPI 2021: 98.8 ML/MIN/1.73
ERYTHROCYTE [DISTWIDTH] IN BLOOD BY AUTOMATED COUNT: 12.6 % (ref 12.3–15.4)
GLUCOSE SERPL-MCNC: 92 MG/DL (ref 65–99)
HBA1C MFR BLD: 5.5 % (ref 4.8–5.6)
HCT VFR BLD AUTO: 36 % (ref 34–46.6)
HGB BLD-MCNC: 11.9 G/DL (ref 12–15.9)
MCH RBC QN AUTO: 30.7 PG (ref 26.6–33)
MCHC RBC AUTO-ENTMCNC: 33.1 G/DL (ref 31.5–35.7)
MCV RBC AUTO: 93 FL (ref 79–97)
PLATELET # BLD AUTO: 268 10*3/MM3 (ref 140–450)
PMV BLD AUTO: 10.1 FL (ref 6–12)
POTASSIUM SERPL-SCNC: 4.5 MMOL/L (ref 3.5–5.2)
QT INTERVAL: 400 MS
QTC INTERVAL: 400 MS
RBC # BLD AUTO: 3.87 10*6/MM3 (ref 3.77–5.28)
RH BLD: POSITIVE
SODIUM SERPL-SCNC: 140 MMOL/L (ref 136–145)
T&S EXPIRATION DATE: NORMAL
WBC NRBC COR # BLD AUTO: 6.22 10*3/MM3 (ref 3.4–10.8)

## 2023-12-01 PROCEDURE — 86901 BLOOD TYPING SEROLOGIC RH(D): CPT | Performed by: PHYSICIAN ASSISTANT

## 2023-12-01 PROCEDURE — 93005 ELECTROCARDIOGRAM TRACING: CPT

## 2023-12-01 PROCEDURE — 86850 RBC ANTIBODY SCREEN: CPT | Performed by: PHYSICIAN ASSISTANT

## 2023-12-01 PROCEDURE — 86900 BLOOD TYPING SEROLOGIC ABO: CPT | Performed by: PHYSICIAN ASSISTANT

## 2023-12-01 PROCEDURE — 36415 COLL VENOUS BLD VENIPUNCTURE: CPT

## 2023-12-01 PROCEDURE — 80048 BASIC METABOLIC PNL TOTAL CA: CPT

## 2023-12-01 PROCEDURE — 83036 HEMOGLOBIN GLYCOSYLATED A1C: CPT | Performed by: PHYSICIAN ASSISTANT

## 2023-12-01 PROCEDURE — 85027 COMPLETE CBC AUTOMATED: CPT

## 2023-12-01 RX ORDER — TRIAMCINOLONE ACETONIDE 55 UG/1
2 SPRAY, METERED NASAL DAILY PRN
COMMUNITY

## 2023-12-01 NOTE — DISCHARGE INSTRUCTIONS
Take the following medications the morning of surgery:  NONE    ARRIVAL TIME TO BE DETERMINED. YOU WILL BE CALLED THE DAY PRIOR TO SURGERY WITH YOUR ARRIVAL TIME          If you are on prescription narcotic pain medication to control your pain you may also take that medication the morning of surgery.    General Instructions:  Do not eat solid food after midnight the night before surgery.  You may drink clear liquids day of surgery but must stop at least one hour before your hospital arrival time.  It is beneficial for you to have a clear drink that contains carbohydrates the day of surgery.  We suggest a 12 to 20 ounce bottle of Gatorade or Powerade for non-diabetic patients or a 12 to 20 ounce bottle of G2 or Powerade Zero for diabetic patients. (Pediatric patients, are not advised to drink a 12 to 20 ounce carbohydrate drink)    Clear liquids are liquids you can see through.  Nothing red in color.     Plain water                               Sports drinks  Sodas                                   Gelatin (Jell-O)  Fruit juices without pulp such as white grape juice and apple juice  Popsicles that contain no fruit or yogurt  Tea or coffee (no cream or milk added)  Gatorade / Powerade  G2 / Powerade Zero      Patients who avoid smoking, chewing tobacco and alcohol for 4 weeks prior to surgery have a reduced risk of post-operative complications.  Quit smoking as many days before surgery as you can.  Do not smoke, use chewing tobacco or drink alcohol the day of surgery.   If applicable bring your C-PAP/ BI-PAP machine in with you to preop day of surgery.  Bring any papers given to you in the doctor’s office.  Wear clean comfortable clothes.  Do not wear contact lenses, false eyelashes or make-up.  Bring a case for your glasses.   Bring crutches or walker if applicable.  Remove all piercings.  Leave jewelry and any other valuables at home.  Hair extensions with metal clips must be removed prior to surgery.  The  Pre-Admission Testing nurse will instruct you to bring medications if unable to obtain an accurate list in Pre-Admission Testing.        If you were given a blood bank ID arm band remember to bring it with you the day of surgery.    Preventing a Surgical Site Infection:  For 2 to 3 days before surgery, avoid shaving with a razor because the razor can irritate skin and make it easier to develop an infection.    Any areas of open skin can increase the risk of a post-operative wound infection by allowing bacteria to enter and travel throughout the body.  Notify your surgeon if you have any skin wounds / rashes even if it is not near the expected surgical site.  The area will need assessed to determine if surgery should be delayed until it is healed.  The night prior to surgery shower using a fresh bar of anti-bacterial soap (such as Dial) and clean washcloth.  Sleep in a clean bed with clean clothing.  Do not allow pets to sleep with you.  Shower on the morning of surgery using a fresh bar of anti-bacterial soap (such as Dial) and clean washcloth.  Dry with a clean towel and dress in clean clothing.  Ask your surgeon if you will be receiving antibiotics prior to surgery.  Make sure you, your family, and all healthcare providers clean their hands with soap and water or an alcohol based hand  before caring for you or your wound.    CHLORHEXIDINE CLOTH INSTRUCTIONS  The morning of surgery follow these instructions using the Chlorhexidine cloths you've been given.  These steps reduce bacteria on the body.  Do not use the cloths near your eyes, ears mouth, genitalia or on open wounds.  Throw the cloths away after use but do not try to flush them down a toilet.      Open and remove one cloth at a time from the package.    Leave the cloth unfolded and begin the bathing.  Massage the skin with the cloths using gentle pressure to remove bacteria.  Do not scrub harshly.   Follow the steps below with one 2% CHG cloth per  area (6 total cloths).  One cloth for neck, shoulders and chest.  One cloth for both arms, hands, fingers and underarms (do underarms last).  One cloth for the abdomen followed by groin.  One cloth for right leg and foot including between the toes.  One cloth for left leg and foot including between the toes.  The last cloth is to be used for the back of the neck, back and buttocks.    Allow the CHG to air dry 3 minutes on the skin which will give it time to work and decrease the chance of irritation.  The skin may feel sticky until it is dry.  Do not rinse with water or any other liquid or you will lose the beneficial effects of the CHG.  If mild skin irritation occurs, do rinse the skin to remove the CHG.  Report this to the nurse at time of admission.  Do not apply lotions, creams, ointments, deodorants or perfumes after using the clothes. Dress in clean clothes before coming to the hospital.      Day of surgery:  Your arrival time is approximately two hours before your scheduled surgery time.  Upon arrival, a Pre-op nurse and Anesthesiologist will review your health history, obtain vital signs, and answer questions you may have.  The only belongings needed at this time will be a list of your home medications and if applicable your C-PAP/BI-PAP machine.  A Pre-op nurse will start an IV and you may receive medication in preparation for surgery, including something to help you relax.     Please be aware that surgery does come with discomfort.  We want to make every effort to control your discomfort so please discuss any uncontrolled symptoms with your nurse.   Your doctor will most likely have prescribed pain medications.      If you are going home after surgery you will receive individualized written care instructions before being discharged.  A responsible adult must drive you to and from the hospital on the day of your surgery and stay with you for 24 hours.  Discharge prescriptions can be filled by the hospital  pharmacy during regular pharmacy hours.  If you are having surgery late in the day/evening your prescription may be e-prescribed to your pharmacy.  Please verify your pharmacy hours or chose a 24 hour pharmacy to avoid not having access to your prescription because your pharmacy has closed for the day.    If you are staying overnight following surgery, you will be transported to your hospital room following the recovery period.  [facility] has all private rooms.    If you have any questions please call Pre-Admission Testing at [phone].  Deductibles and co-payments are collected on the day of service. Please be prepared to pay the required co-pay, deductible or deposit on the day of service as defined by your plan.    Call your surgeon immediately if you experience any of the following symptoms:  Sore Throat  Shortness of Breath or difficulty breathing  Cough  Chills  Body soreness or muscle pain  Headache  Fever  New loss of taste or smell  Do not arrive for your surgery ill.  Your procedure will need to be rescheduled to another time.  You will need to call your physician before the day of surgery to avoid any unnecessary exposure to hospital staff as well as other patients.

## 2023-12-04 ENCOUNTER — TELEPHONE (OUTPATIENT)
Dept: ORTHOPEDIC SURGERY | Facility: HOSPITAL | Age: 63
End: 2023-12-04
Payer: COMMERCIAL

## 2023-12-04 NOTE — TELEPHONE ENCOUNTER
Risk Factor yes no   Age >75  X   BMI <20 >40  X   Patient History     Chronic Pain (2 or more meds/Pain Management) X    ETOH (more than 3 drinks Daily)  X   Uncontrolled Depression/Bipolar/Schizoaffective Disorder  X   Arrhythmias--  X   Stent placement/MI  X   DVT/PE  X   Pacemaker  X   HTN (uncontrolled or requiring more than 2 medications)  X   CHF/Retained fluids/Edema  X   Stroke with Residual   X   COPD/Asthma  X   ARGELIA--Non-compliant with CPAP  X   Diabetes (on insulin or more than 2 meds)         A1C:  X   BPH/Urinary retention (on medication)  X   CKD  X   Home environment and support     Current ambulation status (use of cane, walker, W/C, Multiple falls/weakness)  X   Stairs to enter and throughout home X    Lives Alone  X   Doesn't have support at home  X   Outpatient Screening Assessment    Home needs: (Walker/BSC):  Has walker   ? Steps 3 steps to get into the house, able to stay on the main level. Was concerned because her walkway does have a slight incline that she would like to practice before she leaves.   Caregiver 24-48hrs post-discharge:      Discharge Plan:  OP PT with [facility]     Prescriptions: Meds to bed    Home medications:   [] Blood thinner/anti-coag therapy--   [] BPH or diuretic--  [] BP meds--   ? Pain/Anti-inflammatories-- Gabapentin   Pre-op Education:  Educate patient on spinal anesthesia/pain control:  ? patient verbalize understanding    Educate patient on hospital course/timeline:  ?  patient verbalize understanding    Joint Care Class:  ?  yes [] no  Notes:   She wanted to go ahead and gwen her OP PT set with the [facility] as you requested. Please send an order to [facility] for her OP PT to start on 12/11.

## 2023-12-07 NOTE — CASE MANAGEMENT/SOCIAL WORK
Continued Stay Note  [facility]     Patient Name: Rain Camarena  MRN: [id number]  Today's Date: 12/7/2023    Admit Date: (Not on file)    Plan: Home and will do outpatient PT   Discharge Plan       Row Name 12/07/23 1526       Plan    Plan Home and will do outpatient PT    Patient/Family in Agreement with Plan yes    Provided Post Acute Provider List? Refused                   Discharge Codes    No documentation.                       Shannon Epley, RN

## 2023-12-08 ENCOUNTER — APPOINTMENT (OUTPATIENT)
Dept: GENERAL RADIOLOGY | Facility: HOSPITAL | Age: 63
End: 2023-12-08
Payer: COMMERCIAL

## 2023-12-08 ENCOUNTER — HOSPITAL ENCOUNTER (OUTPATIENT)
Facility: HOSPITAL | Age: 63
Setting detail: HOSPITAL OUTPATIENT SURGERY
Discharge: HOME | End: 2023-12-08
Attending: ORTHOPAEDIC SURGERY | Admitting: ORTHOPAEDIC SURGERY
Payer: COMMERCIAL

## 2023-12-08 ENCOUNTER — ANESTHESIA (OUTPATIENT)
Dept: PERIOP | Facility: HOSPITAL | Age: 63
End: 2023-12-08
Payer: COMMERCIAL

## 2023-12-08 ENCOUNTER — ANESTHESIA EVENT (OUTPATIENT)
Dept: PERIOP | Facility: HOSPITAL | Age: 63
End: 2023-12-08
Payer: COMMERCIAL

## 2023-12-08 VITALS
OXYGEN SATURATION: 100 % | TEMPERATURE: 97.7 F | SYSTOLIC BLOOD PRESSURE: 119 MMHG | RESPIRATION RATE: 16 BRPM | HEART RATE: 75 BPM | DIASTOLIC BLOOD PRESSURE: 63 MMHG

## 2023-12-08 DIAGNOSIS — M17.11 PRIMARY OSTEOARTHRITIS OF RIGHT KNEE: Primary | ICD-10-CM

## 2023-12-08 DIAGNOSIS — M17.11 PRIMARY OSTEOARTHRITIS OF RIGHT KNEE: ICD-10-CM

## 2023-12-08 PROCEDURE — 73560 X-RAY EXAM OF KNEE 1 OR 2: CPT

## 2023-12-08 PROCEDURE — 25010000002 FENTANYL CITRATE (PF) 100 MCG/2ML SOLUTION: Performed by: NURSE ANESTHETIST, CERTIFIED REGISTERED

## 2023-12-08 PROCEDURE — 25810000003 LACTATED RINGERS PER 1000 ML: Performed by: NURSE ANESTHETIST, CERTIFIED REGISTERED

## 2023-12-08 PROCEDURE — 25010000002 ACETAMINOPHEN 10 MG/ML SOLUTION: Performed by: NURSE ANESTHETIST, CERTIFIED REGISTERED

## 2023-12-08 PROCEDURE — 25010000002 MIDAZOLAM PER 1 MG: Performed by: ANESTHESIOLOGY

## 2023-12-08 PROCEDURE — 25010000002 ROPIVACAINE PER 1 MG: Performed by: ORTHOPAEDIC SURGERY

## 2023-12-08 PROCEDURE — 25010000002 ROPIVACAINE PER 1 MG: Performed by: ANESTHESIOLOGY

## 2023-12-08 PROCEDURE — C1776 JOINT DEVICE (IMPLANTABLE): HCPCS | Performed by: ORTHOPAEDIC SURGERY

## 2023-12-08 PROCEDURE — 97116 GAIT TRAINING THERAPY: CPT

## 2023-12-08 PROCEDURE — S0260 H&P FOR SURGERY: HCPCS | Performed by: ORTHOPAEDIC SURGERY

## 2023-12-08 PROCEDURE — 27447 TOTAL KNEE ARTHROPLASTY: CPT | Performed by: ORTHOPAEDIC SURGERY

## 2023-12-08 PROCEDURE — 25010000002 CEFAZOLIN IN DEXTROSE 2-4 GM/100ML-% SOLUTION: Performed by: PHYSICIAN ASSISTANT

## 2023-12-08 PROCEDURE — 25010000002 PROPOFOL 10 MG/ML EMULSION: Performed by: NURSE ANESTHETIST, CERTIFIED REGISTERED

## 2023-12-08 PROCEDURE — 25010000002 VANCOMYCIN 10 G RECONSTITUTED SOLUTION: Performed by: PHYSICIAN ASSISTANT

## 2023-12-08 PROCEDURE — 25810000003 LACTATED RINGERS PER 1000 ML: Performed by: ANESTHESIOLOGY

## 2023-12-08 PROCEDURE — 25010000002 FENTANYL CITRATE (PF) 50 MCG/ML SOLUTION: Performed by: NURSE ANESTHETIST, CERTIFIED REGISTERED

## 2023-12-08 PROCEDURE — C1713 ANCHOR/SCREW BN/BN,TIS/BN: HCPCS | Performed by: ORTHOPAEDIC SURGERY

## 2023-12-08 PROCEDURE — 25010000002 ONDANSETRON PER 1 MG: Performed by: NURSE ANESTHETIST, CERTIFIED REGISTERED

## 2023-12-08 PROCEDURE — 25010000002 ONDANSETRON PER 1 MG: Performed by: ORTHOPAEDIC SURGERY

## 2023-12-08 PROCEDURE — 25010000002 FENTANYL CITRATE (PF) 50 MCG/ML SOLUTION: Performed by: ANESTHESIOLOGY

## 2023-12-08 PROCEDURE — 25010000002 DEXAMETHASONE SODIUM PHOSPHATE 20 MG/5ML SOLUTION: Performed by: NURSE ANESTHETIST, CERTIFIED REGISTERED

## 2023-12-08 PROCEDURE — 25810000003 LACTATED RINGERS SOLUTION: Performed by: PHYSICIAN ASSISTANT

## 2023-12-08 PROCEDURE — 25010000002 MEPIVACAINE HCL (PF) 1.5 % SOLUTION: Performed by: ANESTHESIOLOGY

## 2023-12-08 PROCEDURE — 25010000002 CEFAZOLIN PER 500 MG: Performed by: ORTHOPAEDIC SURGERY

## 2023-12-08 PROCEDURE — 97530 THERAPEUTIC ACTIVITIES: CPT

## 2023-12-08 PROCEDURE — 25010000002 MORPHINE PER 10 MG: Performed by: ORTHOPAEDIC SURGERY

## 2023-12-08 PROCEDURE — 25010000002 HYDROMORPHONE PER 4 MG: Performed by: NURSE ANESTHETIST, CERTIFIED REGISTERED

## 2023-12-08 PROCEDURE — 20985 CPTR-ASST DIR MS PX: CPT | Performed by: ORTHOPAEDIC SURGERY

## 2023-12-08 PROCEDURE — 25010000002 SUGAMMADEX 200 MG/2ML SOLUTION: Performed by: NURSE ANESTHETIST, CERTIFIED REGISTERED

## 2023-12-08 PROCEDURE — 97161 PT EVAL LOW COMPLEX 20 MIN: CPT

## 2023-12-08 PROCEDURE — 25810000003 SODIUM CHLORIDE 0.9 % SOLUTION: Performed by: PHYSICIAN ASSISTANT

## 2023-12-08 PROCEDURE — 25010000002 EPINEPHRINE 1 MG/ML SOLUTION 30 ML VIAL: Performed by: ORTHOPAEDIC SURGERY

## 2023-12-08 DEVICE — IMPLANTABLE DEVICE
Type: IMPLANTABLE DEVICE | Site: KNEE | Status: FUNCTIONAL
Brand: PERSONA®

## 2023-12-08 DEVICE — IMPLANTABLE DEVICE
Type: IMPLANTABLE DEVICE | Site: KNEE | Status: FUNCTIONAL
Brand: PERSONA® VIVACIT-E®

## 2023-12-08 DEVICE — CAP TOTL KN CMT PRIMARY W/ROSA: Type: IMPLANTABLE DEVICE | Status: FUNCTIONAL

## 2023-12-08 DEVICE — CAP EXT STEM KN UPCHRG: Type: IMPLANTABLE DEVICE | Status: FUNCTIONAL

## 2023-12-08 DEVICE — DEV CONTRL TISS STRATAFIX PDS PLS OS6 REV SZ1 18IN 45CM: Type: IMPLANTABLE DEVICE | Site: KNEE | Status: FUNCTIONAL

## 2023-12-08 DEVICE — IMPLANTABLE DEVICE
Type: IMPLANTABLE DEVICE | Site: KNEE | Status: FUNCTIONAL
Brand: REFOBACIN® BONE CEMENT R

## 2023-12-08 DEVICE — IMPLANTABLE DEVICE
Type: IMPLANTABLE DEVICE | Site: KNEE | Status: FUNCTIONAL
Brand: PERSONA® NATURAL TIBIA®

## 2023-12-08 DEVICE — DEV CONTRL TISS STRATAFIX SYMM PDS PLUS VIL CT-1 45CM: Type: IMPLANTABLE DEVICE | Site: KNEE | Status: FUNCTIONAL

## 2023-12-08 DEVICE — ARISTA AH ABSORBABLE HEMOSTATIC PARTICLES
Type: IMPLANTABLE DEVICE | Site: KNEE | Status: FUNCTIONAL
Brand: ARISTA™ AH

## 2023-12-08 DEVICE — DEV CONTRL TISS STRATAFIX SPIRAL MNCRYL UD 3/0 PLS 30CM: Type: IMPLANTABLE DEVICE | Site: KNEE | Status: FUNCTIONAL

## 2023-12-08 RX ORDER — DIPHENHYDRAMINE HYDROCHLORIDE 50 MG/ML
12.5 INJECTION INTRAMUSCULAR; INTRAVENOUS
Status: DISCONTINUED | OUTPATIENT
Start: 2023-12-08 | End: 2023-12-08 | Stop reason: HOSPADM

## 2023-12-08 RX ORDER — SODIUM CHLORIDE, SODIUM LACTATE, POTASSIUM CHLORIDE, CALCIUM CHLORIDE 600; 310; 30; 20 MG/100ML; MG/100ML; MG/100ML; MG/100ML
9 INJECTION, SOLUTION INTRAVENOUS CONTINUOUS
Status: DISCONTINUED | OUTPATIENT
Start: 2023-12-08 | End: 2023-12-08 | Stop reason: HOSPADM

## 2023-12-08 RX ORDER — LIDOCAINE HYDROCHLORIDE 20 MG/ML
INJECTION, SOLUTION INFILTRATION; PERINEURAL AS NEEDED
Status: DISCONTINUED | OUTPATIENT
Start: 2023-12-08 | End: 2023-12-08 | Stop reason: SURG

## 2023-12-08 RX ORDER — MAGNESIUM HYDROXIDE 1200 MG/15ML
LIQUID ORAL AS NEEDED
Status: DISCONTINUED | OUTPATIENT
Start: 2023-12-08 | End: 2023-12-08 | Stop reason: HOSPADM

## 2023-12-08 RX ORDER — ACETAMINOPHEN 325 MG/1
325 TABLET ORAL EVERY 4 HOURS PRN
Status: CANCELLED | OUTPATIENT
Start: 2023-12-08

## 2023-12-08 RX ORDER — DEXAMETHASONE SODIUM PHOSPHATE 4 MG/ML
INJECTION, SOLUTION INTRA-ARTICULAR; INTRALESIONAL; INTRAMUSCULAR; INTRAVENOUS; SOFT TISSUE AS NEEDED
Status: DISCONTINUED | OUTPATIENT
Start: 2023-12-08 | End: 2023-12-08 | Stop reason: SURG

## 2023-12-08 RX ORDER — FENTANYL CITRATE 50 UG/ML
INJECTION, SOLUTION INTRAMUSCULAR; INTRAVENOUS AS NEEDED
Status: DISCONTINUED | OUTPATIENT
Start: 2023-12-08 | End: 2023-12-08 | Stop reason: SURG

## 2023-12-08 RX ORDER — PROMETHAZINE HYDROCHLORIDE 25 MG/1
25 TABLET ORAL ONCE AS NEEDED
Status: DISCONTINUED | OUTPATIENT
Start: 2023-12-08 | End: 2023-12-08 | Stop reason: HOSPADM

## 2023-12-08 RX ORDER — LABETALOL HYDROCHLORIDE 5 MG/ML
5 INJECTION, SOLUTION INTRAVENOUS
Status: DISCONTINUED | OUTPATIENT
Start: 2023-12-08 | End: 2023-12-08 | Stop reason: HOSPADM

## 2023-12-08 RX ORDER — IPRATROPIUM BROMIDE AND ALBUTEROL SULFATE 2.5; .5 MG/3ML; MG/3ML
3 SOLUTION RESPIRATORY (INHALATION) ONCE AS NEEDED
Status: DISCONTINUED | OUTPATIENT
Start: 2023-12-08 | End: 2023-12-08 | Stop reason: HOSPADM

## 2023-12-08 RX ORDER — OXYCODONE HYDROCHLORIDE AND ACETAMINOPHEN 5; 325 MG/1; MG/1
1-2 TABLET ORAL EVERY 4 HOURS PRN
Qty: 30 TABLET | Refills: 0 | Status: SHIPPED | OUTPATIENT
Start: 2023-12-08 | End: 2023-12-11 | Stop reason: SDUPTHER

## 2023-12-08 RX ORDER — ONDANSETRON 2 MG/ML
INJECTION INTRAMUSCULAR; INTRAVENOUS AS NEEDED
Status: DISCONTINUED | OUTPATIENT
Start: 2023-12-08 | End: 2023-12-08 | Stop reason: SURG

## 2023-12-08 RX ORDER — CEFAZOLIN SODIUM 2 G/100ML
2000 INJECTION, SOLUTION INTRAVENOUS EVERY 8 HOURS
Status: CANCELLED | OUTPATIENT
Start: 2023-12-08 | End: 2023-12-09

## 2023-12-08 RX ORDER — LIDOCAINE HYDROCHLORIDE 10 MG/ML
0.5 INJECTION, SOLUTION INFILTRATION; PERINEURAL ONCE AS NEEDED
Status: DISCONTINUED | OUTPATIENT
Start: 2023-12-08 | End: 2023-12-08 | Stop reason: HOSPADM

## 2023-12-08 RX ORDER — DOCUSATE SODIUM 100 MG/1
100 CAPSULE, LIQUID FILLED ORAL ONCE
Status: DISCONTINUED | OUTPATIENT
Start: 2023-12-08 | End: 2023-12-08 | Stop reason: HOSPADM

## 2023-12-08 RX ORDER — OXYCODONE HYDROCHLORIDE AND ACETAMINOPHEN 5; 325 MG/1; MG/1
2 TABLET ORAL EVERY 4 HOURS PRN
Status: CANCELLED | OUTPATIENT
Start: 2023-12-08 | End: 2023-12-15

## 2023-12-08 RX ORDER — HYDRALAZINE HYDROCHLORIDE 20 MG/ML
5 INJECTION INTRAMUSCULAR; INTRAVENOUS
Status: DISCONTINUED | OUTPATIENT
Start: 2023-12-08 | End: 2023-12-08 | Stop reason: HOSPADM

## 2023-12-08 RX ORDER — POLYETHYLENE GLYCOL 3350 17 G/17G
17 POWDER, FOR SOLUTION ORAL DAILY
Status: CANCELLED | OUTPATIENT
Start: 2023-12-08

## 2023-12-08 RX ORDER — SODIUM CHLORIDE 0.9 % (FLUSH) 0.9 %
3 SYRINGE (ML) INJECTION EVERY 12 HOURS SCHEDULED
Status: DISCONTINUED | OUTPATIENT
Start: 2023-12-08 | End: 2023-12-08 | Stop reason: HOSPADM

## 2023-12-08 RX ORDER — FAMOTIDINE 10 MG/ML
20 INJECTION, SOLUTION INTRAVENOUS ONCE
Status: COMPLETED | OUTPATIENT
Start: 2023-12-08 | End: 2023-12-08

## 2023-12-08 RX ORDER — SODIUM CHLORIDE, SODIUM LACTATE, POTASSIUM CHLORIDE, CALCIUM CHLORIDE 600; 310; 30; 20 MG/100ML; MG/100ML; MG/100ML; MG/100ML
INJECTION, SOLUTION INTRAVENOUS CONTINUOUS PRN
Status: DISCONTINUED | OUTPATIENT
Start: 2023-12-08 | End: 2023-12-08 | Stop reason: SURG

## 2023-12-08 RX ORDER — PREGABALIN 75 MG/1
150 CAPSULE ORAL ONCE
Status: COMPLETED | OUTPATIENT
Start: 2023-12-08 | End: 2023-12-08

## 2023-12-08 RX ORDER — HYDROMORPHONE HYDROCHLORIDE 1 MG/ML
0.5 INJECTION, SOLUTION INTRAMUSCULAR; INTRAVENOUS; SUBCUTANEOUS
Status: DISCONTINUED | OUTPATIENT
Start: 2023-12-08 | End: 2023-12-08 | Stop reason: HOSPADM

## 2023-12-08 RX ORDER — ONDANSETRON 4 MG/1
4 TABLET, FILM COATED ORAL EVERY 8 HOURS PRN
Qty: 10 TABLET | Refills: 0 | Status: SHIPPED | OUTPATIENT
Start: 2023-12-08 | End: 2023-12-11 | Stop reason: SDUPTHER

## 2023-12-08 RX ORDER — FLUMAZENIL 0.1 MG/ML
0.2 INJECTION INTRAVENOUS AS NEEDED
Status: DISCONTINUED | OUTPATIENT
Start: 2023-12-08 | End: 2023-12-08 | Stop reason: HOSPADM

## 2023-12-08 RX ORDER — MELOXICAM 15 MG/1
15 TABLET ORAL ONCE
Status: DISCONTINUED | OUTPATIENT
Start: 2023-12-08 | End: 2023-12-08 | Stop reason: HOSPADM

## 2023-12-08 RX ORDER — CEFAZOLIN SODIUM 2 G/100ML
2 INJECTION, SOLUTION INTRAVENOUS ONCE
Status: COMPLETED | OUTPATIENT
Start: 2023-12-08 | End: 2023-12-08

## 2023-12-08 RX ORDER — DOCUSATE SODIUM 100 MG/1
100 CAPSULE, LIQUID FILLED ORAL 2 TIMES DAILY
Qty: 60 CAPSULE | Refills: 0 | Status: SHIPPED | OUTPATIENT
Start: 2023-12-08

## 2023-12-08 RX ORDER — ROPIVACAINE HYDROCHLORIDE 5 MG/ML
INJECTION, SOLUTION EPIDURAL; INFILTRATION; PERINEURAL
Status: COMPLETED | OUTPATIENT
Start: 2023-12-08 | End: 2023-12-08

## 2023-12-08 RX ORDER — TRANEXAMIC ACID 100 MG/ML
INJECTION, SOLUTION INTRAVENOUS AS NEEDED
Status: DISCONTINUED | OUTPATIENT
Start: 2023-12-08 | End: 2023-12-08 | Stop reason: SURG

## 2023-12-08 RX ORDER — POLYETHYLENE GLYCOL 3350 17 G/17G
17 POWDER, FOR SOLUTION ORAL DAILY
Qty: 238 G | Refills: 0 | Status: SHIPPED | OUTPATIENT
Start: 2023-12-08 | End: 2023-12-22

## 2023-12-08 RX ORDER — FENTANYL CITRATE 50 UG/ML
50 INJECTION, SOLUTION INTRAMUSCULAR; INTRAVENOUS
Status: DISCONTINUED | OUTPATIENT
Start: 2023-12-08 | End: 2023-12-08 | Stop reason: HOSPADM

## 2023-12-08 RX ORDER — OXYCODONE HYDROCHLORIDE AND ACETAMINOPHEN 5; 325 MG/1; MG/1
1 TABLET ORAL EVERY 4 HOURS PRN
Status: CANCELLED | OUTPATIENT
Start: 2023-12-08 | End: 2023-12-15

## 2023-12-08 RX ORDER — ROCURONIUM BROMIDE 10 MG/ML
INJECTION, SOLUTION INTRAVENOUS AS NEEDED
Status: DISCONTINUED | OUTPATIENT
Start: 2023-12-08 | End: 2023-12-08 | Stop reason: SURG

## 2023-12-08 RX ORDER — MIDAZOLAM HYDROCHLORIDE 1 MG/ML
1 INJECTION INTRAMUSCULAR; INTRAVENOUS
Status: DISCONTINUED | OUTPATIENT
Start: 2023-12-08 | End: 2023-12-08 | Stop reason: HOSPADM

## 2023-12-08 RX ORDER — NALOXONE HCL 0.4 MG/ML
0.2 VIAL (ML) INJECTION AS NEEDED
Status: DISCONTINUED | OUTPATIENT
Start: 2023-12-08 | End: 2023-12-08 | Stop reason: HOSPADM

## 2023-12-08 RX ORDER — ONDANSETRON 2 MG/ML
4 INJECTION INTRAMUSCULAR; INTRAVENOUS ONCE AS NEEDED
Status: DISCONTINUED | OUTPATIENT
Start: 2023-12-08 | End: 2023-12-08 | Stop reason: HOSPADM

## 2023-12-08 RX ORDER — SODIUM CHLORIDE 0.9 % (FLUSH) 0.9 %
3-10 SYRINGE (ML) INJECTION AS NEEDED
Status: DISCONTINUED | OUTPATIENT
Start: 2023-12-08 | End: 2023-12-08 | Stop reason: HOSPADM

## 2023-12-08 RX ORDER — PROMETHAZINE HYDROCHLORIDE 25 MG/1
25 SUPPOSITORY RECTAL ONCE AS NEEDED
Status: DISCONTINUED | OUTPATIENT
Start: 2023-12-08 | End: 2023-12-08 | Stop reason: HOSPADM

## 2023-12-08 RX ORDER — DROPERIDOL 2.5 MG/ML
0.62 INJECTION, SOLUTION INTRAMUSCULAR; INTRAVENOUS
Status: DISCONTINUED | OUTPATIENT
Start: 2023-12-08 | End: 2023-12-08 | Stop reason: HOSPADM

## 2023-12-08 RX ORDER — HYDROCODONE BITARTRATE AND ACETAMINOPHEN 5; 325 MG/1; MG/1
1 TABLET ORAL ONCE AS NEEDED
Status: DISCONTINUED | OUTPATIENT
Start: 2023-12-08 | End: 2023-12-08 | Stop reason: HOSPADM

## 2023-12-08 RX ORDER — OXYCODONE AND ACETAMINOPHEN 7.5; 325 MG/1; MG/1
1 TABLET ORAL EVERY 4 HOURS PRN
Status: DISCONTINUED | OUTPATIENT
Start: 2023-12-08 | End: 2023-12-08 | Stop reason: HOSPADM

## 2023-12-08 RX ORDER — PROPOFOL 10 MG/ML
VIAL (ML) INTRAVENOUS AS NEEDED
Status: DISCONTINUED | OUTPATIENT
Start: 2023-12-08 | End: 2023-12-08 | Stop reason: SURG

## 2023-12-08 RX ORDER — FENTANYL CITRATE 50 UG/ML
50 INJECTION, SOLUTION INTRAMUSCULAR; INTRAVENOUS ONCE AS NEEDED
Status: COMPLETED | OUTPATIENT
Start: 2023-12-08 | End: 2023-12-08

## 2023-12-08 RX ORDER — ONDANSETRON 4 MG/1
4 TABLET, FILM COATED ORAL ONCE AS NEEDED
Status: DISCONTINUED | OUTPATIENT
Start: 2023-12-08 | End: 2023-12-08 | Stop reason: HOSPADM

## 2023-12-08 RX ORDER — EPHEDRINE SULFATE 50 MG/ML
5 INJECTION, SOLUTION INTRAVENOUS ONCE AS NEEDED
Status: DISCONTINUED | OUTPATIENT
Start: 2023-12-08 | End: 2023-12-08 | Stop reason: HOSPADM

## 2023-12-08 RX ORDER — ONDANSETRON 2 MG/ML
4 INJECTION INTRAMUSCULAR; INTRAVENOUS ONCE AS NEEDED
Status: COMPLETED | OUTPATIENT
Start: 2023-12-08 | End: 2023-12-08

## 2023-12-08 RX ORDER — ACETAMINOPHEN 10 MG/ML
INJECTION, SOLUTION INTRAVENOUS AS NEEDED
Status: DISCONTINUED | OUTPATIENT
Start: 2023-12-08 | End: 2023-12-08 | Stop reason: SURG

## 2023-12-08 RX ADMIN — VANCOMYCIN HYDROCHLORIDE 1250 MG: 10 INJECTION, POWDER, LYOPHILIZED, FOR SOLUTION INTRAVENOUS at 06:20

## 2023-12-08 RX ADMIN — ACETAMINOPHEN 1000 MG: 10 INJECTION, SOLUTION INTRAVENOUS at 07:25

## 2023-12-08 RX ADMIN — DEXAMETHASONE SODIUM PHOSPHATE 8 MG: 4 INJECTION, SOLUTION INTRAMUSCULAR; INTRAVENOUS at 07:24

## 2023-12-08 RX ADMIN — ONDANSETRON 4 MG: 2 INJECTION INTRAMUSCULAR; INTRAVENOUS at 11:45

## 2023-12-08 RX ADMIN — TRANEXAMIC ACID 1000 MG: 100 INJECTION INTRAVENOUS at 07:25

## 2023-12-08 RX ADMIN — CEFAZOLIN SODIUM 2 G: 2 INJECTION, SOLUTION INTRAVENOUS at 07:01

## 2023-12-08 RX ADMIN — PREGABALIN 150 MG: 75 CAPSULE ORAL at 06:16

## 2023-12-08 RX ADMIN — FAMOTIDINE 20 MG: 10 INJECTION INTRAVENOUS at 06:27

## 2023-12-08 RX ADMIN — MEPIVACAINE HYDROCHLORIDE 5 ML: 15 INJECTION, SOLUTION EPIDURAL; INFILTRATION at 06:33

## 2023-12-08 RX ADMIN — PROPOFOL 20 MG: 10 INJECTION, EMULSION INTRAVENOUS at 08:20

## 2023-12-08 RX ADMIN — FENTANYL CITRATE 50 MCG: 50 INJECTION, SOLUTION INTRAMUSCULAR; INTRAVENOUS at 06:32

## 2023-12-08 RX ADMIN — SODIUM CHLORIDE, POTASSIUM CHLORIDE, SODIUM LACTATE AND CALCIUM CHLORIDE 1000 ML: 600; 310; 30; 20 INJECTION, SOLUTION INTRAVENOUS at 06:06

## 2023-12-08 RX ADMIN — MIDAZOLAM HYDROCHLORIDE 1 MG: 2 INJECTION, SOLUTION INTRAMUSCULAR; INTRAVENOUS at 06:32

## 2023-12-08 RX ADMIN — ONDANSETRON 4 MG: 2 INJECTION INTRAMUSCULAR; INTRAVENOUS at 08:38

## 2023-12-08 RX ADMIN — FENTANYL CITRATE 50 MCG: 50 INJECTION, SOLUTION INTRAMUSCULAR; INTRAVENOUS at 09:19

## 2023-12-08 RX ADMIN — SODIUM CHLORIDE, POTASSIUM CHLORIDE, SODIUM LACTATE AND CALCIUM CHLORIDE: 600; 310; 30; 20 INJECTION, SOLUTION INTRAVENOUS at 07:09

## 2023-12-08 RX ADMIN — PROPOFOL 170 MG: 10 INJECTION, EMULSION INTRAVENOUS at 07:20

## 2023-12-08 RX ADMIN — HYDROMORPHONE HYDROCHLORIDE 0.5 MG: 1 INJECTION, SOLUTION INTRAMUSCULAR; INTRAVENOUS; SUBCUTANEOUS at 09:48

## 2023-12-08 RX ADMIN — PROPOFOL 30 MG: 10 INJECTION, EMULSION INTRAVENOUS at 07:53

## 2023-12-08 RX ADMIN — FENTANYL CITRATE 50 MCG: 50 INJECTION, SOLUTION INTRAMUSCULAR; INTRAVENOUS at 07:19

## 2023-12-08 RX ADMIN — LIDOCAINE HYDROCHLORIDE 90 MG: 20 INJECTION, SOLUTION EPIDURAL; INFILTRATION; INTRACAUDAL; PERINEURAL at 07:20

## 2023-12-08 RX ADMIN — OXYCODONE AND ACETAMINOPHEN 1 TABLET: 7.5; 325 TABLET ORAL at 09:19

## 2023-12-08 RX ADMIN — FENTANYL CITRATE 50 MCG: 50 INJECTION, SOLUTION INTRAMUSCULAR; INTRAVENOUS at 09:33

## 2023-12-08 RX ADMIN — SODIUM CHLORIDE, POTASSIUM CHLORIDE, SODIUM LACTATE AND CALCIUM CHLORIDE 9 ML/HR: 600; 310; 30; 20 INJECTION, SOLUTION INTRAVENOUS at 11:45

## 2023-12-08 RX ADMIN — SUGAMMADEX 200 MG: 100 INJECTION, SOLUTION INTRAVENOUS at 08:41

## 2023-12-08 RX ADMIN — FENTANYL CITRATE 25 MCG: 50 INJECTION, SOLUTION INTRAMUSCULAR; INTRAVENOUS at 08:55

## 2023-12-08 RX ADMIN — ROCURONIUM BROMIDE 40 MG: 10 INJECTION, SOLUTION INTRAVENOUS at 07:20

## 2023-12-08 RX ADMIN — HYDROMORPHONE HYDROCHLORIDE 0.5 MG: 1 INJECTION, SOLUTION INTRAMUSCULAR; INTRAVENOUS; SUBCUTANEOUS at 10:07

## 2023-12-08 RX ADMIN — ROPIVACAINE HYDROCHLORIDE 20 ML: 5 INJECTION EPIDURAL; INFILTRATION; PERINEURAL at 06:33

## 2023-12-08 RX ADMIN — FENTANYL CITRATE 25 MCG: 50 INJECTION, SOLUTION INTRAMUSCULAR; INTRAVENOUS at 08:58

## 2023-12-08 NOTE — THERAPY EVALUATION
Patient Name: Rain Camarena  : 1960    MRN: [id number]                              Today's Date: 2023       Admit Date: 2023    Visit Dx:     ICD-10-CM ICD-9-CM   1. Primary osteoarthritis of right knee  M17.11 715.16     Patient Active Problem List   Diagnosis    Knee pain, chronic    Sacroiliac dysfunction    Lumbar disc disease with radiculopathy    Osteoarthritis of left knee    Cervical spine pain    Acute pain of right shoulder    Spondylolisthesis of lumbar region    Acute pain of right knee    Acute right ankle pain    Left foot pain    Fall on same level from slipping, tripping or stumbling    Old complex tear of medial meniscus of right knee    Primary osteoarthritis of right knee    Primary osteoarthritis of left knee    Obesity (BMI 30.0-34.9)    Arthritis    Absence of both cervix and uterus, acquired    History of gastric bypass    Keratoacanthoma    Menopausal symptom    Primary osteoarthritis of both knees    Knee pain    Acute ankle pain    Pain of cervical spine    Bleeding hemorrhoids    Lumbar radiculopathy    Spinal stenosis of lumbar region with neurogenic claudication     Past Medical History:   Diagnosis Date    Anemia     Ankle sprain     Anxiety     Arthritis of back 2006    Awareness under anesthesia     DURING HYSTERECTOMY    Back pain     Cervical disc disorder     Depression     Dislocation of finger     Gastritis     Hernia     History of COVID-19 2021    X2    History of stomach ulcers     Knee instability, right     Knee sprain     Knee swelling 2010    Low back strain     Lumbosacral disc disease     Osteoarthritis     Pain management     MELVIN CLARK    Seasonal allergies     Sleep apnea     NO MACHINE    Stress fracture     Tear of meniscus of knee     right knee    Tennis elbow     Thoracic disc disorder     Wrist sprain      Past Surgical History:   Procedure Laterality Date    GALLBLADDER SURGERY      DR SINGLETON    HEMORROIDECTOMY      HYSTERECTOMY       2008 LAPAROSCOPIC    LAPAROSCOPIC GASTRIC BYPASS  2010    DR SINGLETON      General Information       Row Name 12/08/23 1317          Physical Therapy Time and Intention    Document Type evaluation  -EJ     Mode of Treatment physical therapy  -       Row Name 12/08/23 1317          General Information    Patient Profile Reviewed yes  -EJ     Prior Level of Function independent:;all household mobility;community mobility;ADL's  -EJ     Existing Precautions/Restrictions no known precautions/restrictions  -EJ     Barriers to Rehab none identified  -       Row Name 12/08/23 1317          Living Environment    People in Home spouse  -EJ       Row Name 12/08/23 1317          Home Main Entrance    Number of Stairs, Main Entrance two  -EJ       Row Name 12/08/23 1317          Stairs Within Home, Primary    Number of Stairs, Within Home, Primary none  -       Row Name 12/08/23 1317          Safety Issues, Functional Mobility    Impairments Affecting Function (Mobility) strength;pain;range of motion (ROM)  -EJ               User Key  (r) = Recorded By, (t) = Taken By, (c) = Cosigned By      Initials Name Provider Type    EJ Tila Padilla, PT Physical Therapist                   Mobility       Row Name 12/08/23 1318          Bed Mobility    Comment, (Bed Mobility) up in chair  -       Row Name 12/08/23 1318          Sit-Stand Transfer    Sit-Stand [place] (Transfers) verbal cues;contact guard  -EJ     Assistive Device (Sit-Stand Transfers) walker, front-wheeled  -       Row Name 12/08/23 1318          Gait/Stairs (Locomotion)    [place] Level (Gait) verbal cues;contact guard  -EJ     Assistive Device (Gait) walker, front-wheeled  -EJ     Distance in Feet (Gait) 60  -EJ     Deviations/Abnormal Patterns (Gait) claudio decreased;antalgic;stride length decreased  -EJ     Bilateral Gait Deviations forward flexed posture  -EJ     Comment, (Gait/Stairs) cues for sequence, no unsteadiness noted, educated on  stair training- pt verbalizes and demos understanding  -               User Key  (r) = Recorded By, (t) = Taken By, (c) = Cosigned By      Initials Name Provider Type    Tila Moya PT Physical Therapist                   Obj/Interventions       Row Name 12/08/23 1319          Range of Motion Comprehensive    General Range of Motion no range of motion deficits identified  -EJ     Comment, General Range of Motion x R knee  -EJ       Row Name 12/08/23 1319          Strength Comprehensive (MMT)    Comment, General Manual Muscle Testing (MMT) Assessment post op weakness  -       Row Name 12/08/23 1319          Motor Skills    Therapeutic Exercise --  R TKR protocol x 5 reps  -EJ               User Key  (r) = Recorded By, (t) = Taken By, (c) = Cosigned By      Initials Name Provider Type    Tila Moya, PT Physical Therapist                   Goals/Plan    No documentation.                  Clinical Impression       Row Name 12/08/23 1319          Pain    Pretreatment Pain Rating 2/10  -EJ     Posttreatment Pain Rating 2/10  -EJ     Pain Location - Side/Orientation Right  -EJ     Pain Location - knee  -EJ     Pain Intervention(s) Repositioned;Ambulation/increased activity  -       Row Name 12/08/23 1319          Plan of Care Review    Plan of Care Reviewed With family;patient  -     Outcome Evaluation Pt seen for PT eval this afternoon. SHe is POD 0 R TKR and presents w expected post op pain and weakness. She is sleepy, but doing well and plans home today. Pt able to stand w CGA using Rwx. She ambulated approx 60 ft w Rwx and CGA. Cues for sequence, no unsteadiness noted. Pt also tolerating knee exercises well. Increased time spent w pt and family educating on rehap progression and exercise program until they are seen by outpatien PT later next week. All questions answered at this time, no further concerns. Pt is safe to DC home from PT standpoint.  -       Row Name 12/08/23 1319           Therapy Assessment/Plan (PT)    Criteria for Skilled Interventions Met (PT) no problems identified which require skilled intervention  -EJ     Therapy Frequency (PT) evaluation only  -       Row Name 12/08/23 1319          Positioning and Restraints    Pre-Treatment Position sitting in chair/recliner  -EJ     Post Treatment Position chair  -EJ     In Chair notified nsg;reclined;with family/caregiver;call light within reach;encouraged to call for assist  -EJ               User Key  (r) = Recorded By, (t) = Taken By, (c) = Cosigned By      Initials Name Provider Type    Tila Moya, PT Physical Therapist                   Outcome Measures       Row Name 12/08/23 1323          How much help from another person do you currently need...    Turning from your back to your side while in flat bed without using bedrails? 4  -EJ     Moving from lying on back to sitting on the side of a flat bed without bedrails? 4  -EJ     Moving to and from a bed to a chair (including a wheelchair)? 3  -EJ     Standing up from a chair using your arms (e.g., wheelchair, bedside chair)? 3  -EJ     Climbing 3-5 steps with a railing? 3  -EJ     To walk in hospital room? 3  -EJ     AM-PAC 6 Clicks Score (PT) 20  -EJ     Highest Level of Mobility Goal 6 --> Walk 10 steps or more  -       Row Name 12/08/23 1323          Functional Assessment    Outcome Measure Options AM-PAC 6 Clicks Basic Mobility (PT)  -EJ               User Key  (r) = Recorded By, (t) = Taken By, (c) = Cosigned By      Initials Name Provider Type    Tila Moya, PT Physical Therapist                                   PT Recommendation and Plan     Plan of Care Reviewed With: family, patient  Outcome Evaluation: Pt seen for PT chase this afternoon. SHe is POD 0 R TKR and presents w expected post op pain and weakness. She is sleepy, but doing well and plans home today. Pt able to stand w CGA using Rwx. She ambulated approx 60 ft w Rwx and CGA. Cues for  sequence, no unsteadiness noted. Pt also tolerating knee exercises well. Increased time spent w pt and family educating on rehap progression and exercise program until they are seen by outpatien PT later next week. All questions answered at this time, no further concerns. Pt is safe to DC home from PT standpoint.     Time Calculation:         PT Charges       Row Name 12/08/23 1323             Time Calculation    Start Time 1238  -EJ      Stop Time 1312  -EJ      Time Calculation (min) 34 min  -EJ      PT Received On 12/08/23  -EJ         Time Calculation- PT    Total Timed Code Minutes- PT 25 minute(s)  -EJ         Timed Charges    22560 - PT Therapeutic Exercise Minutes 5  -EJ      46513 - Gait Training Minutes  10  -EJ      63202 - PT Therapeutic Activity Minutes 10  -EJ         Total Minutes    Timed Charges Total Minutes 25  -EJ       Total Minutes 25  -EJ                User Key  (r) = Recorded By, (t) = Taken By, (c) = Cosigned By      Initials Name Provider Type    Tila Moya, PT Physical Therapist                  Therapy Charges for Today       Code Description Service Date Service Provider Modifiers Qty    43709212998 HC GAIT TRAINING EA 15 MIN 12/8/2023 Tila Padilla, PT GP 1    56395514453 HC PT THERAPEUTIC ACT EA 15 MIN 12/8/2023 Tila Padilla, PT GP 1    01565093998 HC PT EVAL LOW COMPLEXITY 3 12/8/2023 Tila Padilla, PT GP 1            PT G-Codes  Outcome Measure Options: AM-PAC 6 Clicks Basic Mobility (PT)  AM-PAC 6 Clicks Score (PT): 20  PT Discharge Summary  Anticipated Discharge Disposition (PT): home with assist, home with outpatient therapy services    Tila Padilla PT  12/8/2023

## 2023-12-08 NOTE — ANESTHESIA PREPROCEDURE EVALUATION
Anesthesia Evaluation     Patient summary reviewed and Nursing notes reviewed   no history of anesthetic complications:   NPO Solid Status: > 8 hours  NPO Liquid Status: > 2 hours           Airway   Mallampati: II  Dental - normal exam     Pulmonary    (+) ,sleep apnea  Cardiovascular - negative cardio ROS  Exercise tolerance: good (4-7 METS)    ECG reviewed  Rhythm: regular      ROS comment: NORMAL ECG -  Sinus rhythm  No Prior Tracing for Comparison      Neuro/Psych  (+) numbness, psychiatric history Anxiety and Depression  GI/Hepatic/Renal/Endo    (+) GI bleeding     Musculoskeletal     (+) back pain, neck pain, radiculopathy  Abdominal    Substance History - negative use     OB/GYN negative ob/gyn ROS         Other   arthritis,                   Anesthesia Plan    ASA 2     general with block     (/64 (BP Location: Left arm, Patient Position: Sitting)   Pulse 63   Temp 36.5 °C (97.7 °F) (Oral)   Resp 16   SpO2 98%     I have reviewed the patient's history with the patient and the chart, including all pertinent laboratory results and imaging. I have explained the risks of anesthesia including but not limited to dental damage, corneal abrasion, nerve injury, MI, stroke, and death.    )  intravenous induction     Anesthetic plan, risks, benefits, and alternatives have been provided, discussed and informed consent has been obtained with: patient and spouse/significant other.    CODE STATUS:

## 2023-12-08 NOTE — H&P
History & Physical       Patient: Rain Camarena    Date of Admission: 12/8/2023  5:04 AM    YOB: 1960    Medical Record Number: [id number]    Attending Physician: Kamar Alcantar MD        Chief Complaints: Primary osteoarthritis of right knee [M17.11]      History of Present Illness: 63 y.o. female presents with Primary osteoarthritis of right knee [M17.11]. Onset of symptoms was gradual and slowly progressive over the past 5 years or so.  Symptoms are associated with progressive pain and discomfort in the lateral aspect of the knee with a valgus deformity.  Symptoms are aggravated by flexion and extension of the knee including squatting deeply on the ground.   Symptoms improve with intra-articular injections and use of a supportive brace. Patient is now being admitted to the services of Kamar Alcantar MD for further evaluation and treatment.        Allergies   Allergen Reactions    Bactine [Lidocaine-Benzalkonium] Hives    Clindamycin/Lincomycin Hives and GI Intolerance    Hydrocodone Itching, Other (See Comments), GI Intolerance and Dizziness     Headaches    Ibuprofen Hives     HX OF GASTRIC BYPASS    Lidocaine-Benzalkonium Hives    Tramadol Dizziness     HEADACHE    Clindamycin Nausea And Vomiting    Nsaids GI Intolerance     Annotation - [id number]: Stomach pain    HX OF GASTRIC BYPASS    Sulfamethoxazole-Trimethoprim Hives         Home Medications:  Facility-Administered Medications Prior to Admission   Medication Dose Route Frequency Provider Last Rate Last Admin    Sodium Hyaluronate injection 60 mg  60 mg Intra-articular Once Mamadou Tubbs PA-C         Medications Prior to Admission   Medication Sig Dispense Refill Last Dose    Ascorbic Acid 500 MG capsule Take 150 mg by mouth Daily. HOLD FOR SURGERY   Past Week    B Complex Vitamins (VITAMIN B COMPLEX) capsule capsule Take 1 capsule by mouth Daily. HOLD FOR SURGERY   Past Week    BIOTIN PO Take 10,000 mcg by mouth daily.   Past Week     Calcium Carb-Cholecalciferol (CALCIUM 600 + D PO) Take 1 capsule by mouth Daily. HOLD FOR SURGERY   Past Week    CALCIUM PO Take 630 mg by mouth Daily. HOLDING FOR SURGERY   Past Week    Cholecalciferol (VITAMIN D-3 PO) Take 50 mcg by mouth Daily. HOLD FOR SURGERY   Past Week    diphenhydrAMINE-acetaminophen (TYLENOL PM)  MG tablet per tablet Take 1 tablet by mouth At Night As Needed for Sleep.   12/7/2023    esomeprazole (nexIUM) 40 MG capsule Take 1 capsule by mouth Every Morning Before Breakfast. prn   Past Week    estradiol (MINIVELLE, VIVELLE-DOT) 0.05 MG/24HR patch Place 1 patch on the skin as directed by provider 2 (Two) Times a Week.   12/8/2023    fluticasone (FLONASE) 50 MCG/ACT nasal spray prn   Past Week    gabapentin (NEURONTIN) 300 MG capsule Take 1 capsule by mouth Every Night.   12/7/2023    Glucosamine-Chondroit-Vit C-Mn (GLUCOSAMINE 1500 COMPLEX PO) Take 1 tablet by mouth Daily. HOLD FOR SURGERY   Past Week    Lifitegrast 5 % solution Administer 1 drop to both eyes 2 (Two) Times a Day.   12/8/2023    Loratadine 10 MG capsule Take 1 capsule by mouth Daily.   Past Week    MAGNESIUM PO Take 133 mg by mouth Daily. HOLD FOR SURGERY   Past Week    Misc Natural Products (NEURIVA PO) Take 1 capsule by mouth Daily. HOLD FOR SURGERY   Past Week    Multiple Vitamins-Minerals (OCUVITE ADULT 50+ PO) Take 1 tablet by mouth Daily. HOLD FOR SURGERY   Past Week    NON FORMULARY FLAX SEED CAPSULES  HOLD FOR SURGERY   Past Week    Omega-3 Fatty Acids (OMEGA-3 PO) Take 1 tablet by mouth Daily. HOLD FOR SURGERY   Past Week    Probiotic Product (PROBIOTIC DAILY PO) Take 1 tablet by mouth Daily. HOLD FOR SURGERY   Past Week    Pyridoxine HCl (VITAMIN B6 PO) Take 2 mg by mouth.   Past Week    Triamcinolone Acetonide (NASACORT) 55 MCG/ACT nasal inhaler 2 sprays into the nostril(s) as directed by provider Daily As Needed for Rhinitis.   Past Week    Zinc Sulfate (ZINC 15 PO) Take 1 tablet by mouth Daily. HOLD FOR  SURGERY   Past Week    baclofen (LIORESAL) 10 MG tablet Take 1 tablet by mouth As Needed for Muscle Spasms. prn   More than a month       Current Medications:  Scheduled Meds:ceFAZolin, 2 g, Intravenous, Once  lactated ringers, 500 mL, Intravenous, Once  meloxicam, 15 mg, Oral, Once  ropivacaine 0.5 % 50 mL, Morphine 5 mg, EPINEPHrine 1 mg/mL 0.3 mg in sodium chloride 0.9 % 50 mL solution, , Injection, Once  sodium chloride, 3 mL, Intravenous, Q12H  vancomycin, 15 mg/kg, Intravenous, Once      Continuous Infusions:lactated ringers, 9 mL/hr      PRN Meds:.  lidocaine    midazolam    sodium chloride       Past Medical History:   Diagnosis Date    Anemia     Ankle sprain     Anxiety     Arthritis of back 2006    Awareness under anesthesia     DURING HYSTERECTOMY    Back pain     Cervical disc disorder     Depression     Dislocation of finger     Gastritis     Hernia     History of COVID-19 2021    X2    History of stomach ulcers     Knee instability, right     Knee sprain     Knee swelling 2010    Low back strain     Lumbosacral disc disease     Osteoarthritis     Pain management     MELVIN CLARK    Seasonal allergies     Sleep apnea     NO MACHINE    Stress fracture     Tear of meniscus of knee     right knee    Tennis elbow     Thoracic disc disorder     Wrist sprain         Past Surgical History:   Procedure Laterality Date    GALLBLADDER SURGERY  2012    DR SINGLETON    HEMORROIDECTOMY  2023    HYSTERECTOMY      2008 LAPAROSCOPIC    LAPAROSCOPIC GASTRIC BYPASS  2010    DR SINGLETON        Social History     Occupational History    Not on file   Tobacco Use    Smoking status: Never     Passive exposure: Never    Smokeless tobacco: Never   Vaping Use    Vaping Use: Never used   Substance and Sexual Activity    Alcohol use: Yes     Alcohol/week: 1.0 standard drink of alcohol     Types: 1 Drinks containing 0.5 oz of alcohol per week     Comment: FEW DRINKS PER WEEK    Drug use: Never    Sexual activity: Yes     Partners:  Male     Birth control/protection: Post-menopausal, Hysterectomy      Social History     Social History Narrative    Not on file        Family History   Problem Relation Age of Onset    Anesthesia problems Mother     Cancer Sister     Cancer Sister     Hypertension Other     Diabetes Other     Heart disease Other     Cancer Other         ANAL    Malig Hyperthermia Neg Hx          Review of Systems:   HEENT: Patient denies any headaches, vision changes, change in hearing, or tinnitus. Patient denies any rhinorrhea,epistaxis, sinus pain, mouth or dental problems, sore throat or hoarseness, or dysphagia  Pulmonary: Patient denies any cough, congestion, SOA, or wheezing  Cardiovascular: Patient denies any chest pain, dyspnea, palpitations, weakness, intolerance of exercise, varicosities, swelling of extremities, known murmur  Gastrointestinal:  Patient denies nausea, vomiting, diarrhea, constipation, loss  of appetite, change in appetite, dysphagia, gas, heartburn, melena, change in bowel habits, use of laxatives or other drugs to alter the function of the gastrointestinal tract.  Genital/Urinary: Patient denies dysuria, change in color of urine, change in frequency of urination, pain with urgency, incontinence, retention, or nocturia.  Musculoskeletal: Patient denies increased warmth; redness; or swelling of joints; limitation of function; deformity; crepitation: pain in a joint or an extremity, the neck, or the back, especially with movement.  Neurological: Patient denies dizziness, tremor, ataxia, difficulty in speaking, change in speech, paresthesia, loss of sensation, seizures, syncope, changes in memory.  Endocrine system: Patient denies tremors, palpitations, intolerance of heat or cold, polyuria, polydipsia, polyphagia, diaphoresis, exophthalmos, or goiter.  Psychological: Patient denies thoughts/plans of harming self or other; depression,  insomnia, night terrors, sajan, memory loss, disorientation.  Skin:  Patient denies any bruising, rashes, discoloration, pruritus, wounds, ulcers, decubiti, changes in the hair or nails  Hematopoietic: Patient denies history of spontaneous or excessive bleeding, epistaxis, hematuria, melena, fatigue, enlarged or tender lymph nodes, pallor, history of anemia.    Physical Exam: 63 y.o. female  Vitals:    12/08/23 0651 12/08/23 0654 12/08/23 0657 12/08/23 0700   BP:       BP Location:       Patient Position:       Pulse: 69 64 61 63   Resp:       Temp:       TempSrc:       SpO2: 100% 100% 99% 100%       General Appearance:          Alert, cooperative, in no acute distress                                                 Head:    Normocephalic, without obvious abnormality, atraumatic   Eyes:            Lids and lashes normal, conjunctivae and sclerae normal, no   icterus, no pallor, corneas clear, PERRLA   Ears:    Ears appear intact with no abnormalities noted   Throat:   No oral lesions, no thrush, oral mucosa moist   Neck:   No adenopathy, supple, trachea midline, no thyromegaly, no   carotid bruit, no JVD   Back:     No kyphosis present, no scoliosis present, no skin lesions,      erythema or scars, no tenderness to percussion or                   palpation,   range of motion normal   Lungs:     Clear to auscultation,respirations regular, even and                  unlabored    Heart:    Regular rhythm and normal rate, normal S1 and S2, no            murmur, no gallop, no rub, no click   Chest Wall:    No abnormalities observed   Abdomen:     Normal bowel sounds, no masses, no organomegaly, soft        nontender, nondistended, no guarding, no rebound                tenderness   Rectal:     Deferred   Extremities:   Tenderness over lateral aspect of the right knee and proximal tibia. Moves all extremities well, no edema,   no cyanosis, no redness   Pulses:   Pulses palpable and equal bilaterally   Skin:   No bleeding, bruising or rash   Lymph nodes:   No palpable adenopathy    Neurologic:   Cranial nerves 2 - 12 grossly intact, sensation intact, DTR       present and equal bilaterally      Right knee. Positive grind test. Pain and tenderness is present over the lateral aspect of the knee. Patient has some tenderness and irritability of the common peroneal nerve. Lateral joint line is exquisitely painful and tender. Patella is tending to track a little bit laterally. There is thickening of the synovial membrane. Range of motion in flexion is 0-110 degrees. Dorsalis pedis and posterior tibial artery pulses are palpable. Common peroneal nerve function is well preserved. Gait is cautious and antalgic. The patient has valgus orientation of the knee with a higher degree of external rotation than the contralateral side.There is fullness and tenderness in the Popliteal fossa. Getting out of a seated position is painful for the patient.      Diagnostic Tests:  Admission on 12/08/2023   Component Date Value Ref Range Status    Hemoglobin A1C 12/01/2023 5.50  4.80 - 5.60 % Final    ABO Type 12/01/2023 A   Final    RH type 12/01/2023 Positive   Final    Antibody Screen 12/01/2023 Negative   Final    T&S Expiration Date 12/01/2023 12/15/2023 11:59:00 PM   Final     No results found.      Assessment:    Primary osteoarthritis of right knee        Plan:  The patient voiced understanding of the risks, benefits, and alternative forms of treatment that were discussed and the patient consents to proceed with right total knee arthroplasty with robotic assistance.     The patient was seen today for preoperative discussion.  The patient has been tried on over-the-counter and prescription NSAID's despite the risks of anti-inflammatory bleeding, peptic ulcers and erosive gastritis with short term benefit only.  Braces have been prescribed for mechanical support.  Patient has been participating in an exercise program specifically targeting joint pain relief with limited benefit. Intraarticular injections have  been used periodically with some but not complete relief of pain.  Ambulation aids have also been utilized.      The details of the surgical procedure were explained including the location of probable incisions and a description of the likely hardware/grafts to be used. The patient understands the likely convalescence after surgery as well as the rehabilitation required.  Also, we have thoroughly discussed with the patient the risks, benefits and alternatives to surgery.  Risks include but are not limited to the risk of infection, joint stiffness, limited range of motion, wound healing problems, scar tissue build up, myocardial infarction, stroke, blood clots (including DVT and/or pulmonary embolus along with the risk of death) neurologic and/or vascular injury, limb length discrepancy, fracture, dislocation, nonunion, malunion, continued pain and need for further surgery including hardware failure requiring revision.    Discharge Plan: today to home and home health      Date: 12/8/2023    Kamar Alcantar MD      DICTATED UTILIZING DRAGON DICTATION

## 2023-12-08 NOTE — ANESTHESIA PROCEDURE NOTES
Airway  Urgency: elective    Date/Time: 12/8/2023 7:23 AM  Airway not difficult    General Information and Staff    Patient location during procedure: OR  Anesthesiologist: Emelia Rojas MD  CRNA/CAA: Shreya Shah CRNA    Indications and Patient Condition  Indications for airway management: airway protection    Preoxygenated: yes  MILS not maintained throughout  Mask difficulty assessment: 1 - vent by mask    Final Airway Details  Final airway type: endotracheal airway      Successful airway: ETT  Cuffed: yes   Successful intubation technique: direct laryngoscopy  Facilitating devices/methods: intubating stylet  Endotracheal tube insertion site: oral  Blade: Yonis  Blade size: 3  ETT size (mm): 7.0  Cormack-Lehane Classification: grade I - full view of glottis  Placement verified by: chest auscultation and capnometry   Measured from: teeth  ETT/EBT  to teeth (cm): 21  Number of attempts at approach: 1  Assessment: lips, teeth, and gum same as pre-op and atraumatic intubation

## 2023-12-08 NOTE — NURSING NOTE
Patient has not heard from physical therapy,  instructed to call rehab in [facility] and was able to schedule PT for next Thursday. Patient and family encouraged to do exercises   regularly

## 2023-12-08 NOTE — OP NOTE
TOTAL KNEE ARTHROPLASTY OPERATIVE     PATIENT NAME:Rain Camarena     YOB: 1960     ATTENDING PHYSICIAN: Kamar Alcantar MD     DATE OF PROCEDURE: 12/8/2023     PREOPERATIVE DIAGNOSIS: Severe degenerative osteoarthritis, right knee.    POSTOPERATIVE DIAGNOSIS: Post-Op Diagnosis Codes:     * Primary osteoarthritis of right knee [M17.11]    PRINCIPAL DIAGNOSIS: Severe degenerative osteoarthritis right knee with a valgus deformity.    PROCEDURE: Right total knee arthroplasty.    Surgical Approach: Knee Mid-Vastus     SURGEON:  Kamar Alcantar M.D.    ASSISTANT: first Rachael assistant was responsible for performing the following activities: Retraction, Suction, Irrigation, Suturing, Closing, and Placing Dressing and their skilled assistance was necessary for the success of this case.       ANESTHESIOLOGIST: Dr. Emelia Rojas MD    ANESTHESIA: Adductor canal block for postop pain control followed by general anesthesia.    POSITION: Supine on the operating room table.    DRAINS: None.    COMPLICATIONS: None.    ESTIMATED BLOOD LOSS: 100ml    IMPLANT USED: [employer] total knee replacement system, #8 standard posterior cruciate retaining femoral component, number E right tibia with a 30 mm intramedullary stem, 11 mm thick MC, medially constrained Vitamin E impregnated polyethylene insert, #35 patella anchored into position using Refobicin antibiotic impregnated bone cement.     SPECIMENS: * No orders in the log *    Please note: We used the [employer] robotic arm system intraoperatively for appropriate soft tissue balancing, ligamentous balancing and alignment of the bony cuts.    INDICATION: The patient has been having very significant pain and discomfort in the knee.  We had scheduled the patient for total knee replacement after all forms of conservative nonoperative care had failed to provide adequate relief of symptoms.  Patient understood all the risks and benefits which were discussed in great  detail including the possibility of death, infection, myocardial infarction, DVT, pulmonary embolism, stiffness of the knee, wound infection and breakdown, possibility of revision surgery, neurovascular compromise, pneumonia, pulmonary embolism      DETAILS OF PROCEDURE: Surgical timeout was called. Operative extremity was correctly identified in the operating room suite. Patient was placed under appropriate anesthetic.  Patient was administered IV antibiotics per [facility] protocol and hospital policy. The patient’s operative extremity was correctly identified in the operating room suite.A Tourniquet was applied after the leg has been exsanguinated. The correctly identified extremity was prepped and draped in a standard fashion.      An anterior approach was performed and a quad sparing, subvastus approach was carried out. The patellofemoral ligament was resected. Medial soft tissue release was carried out on the medial face of the tibia to balance the soft tissues and to release the contracture of the knee MCL. Tibial Osteophytes were resected. Severe bone-on-bone appearance was noted.  A thorough synovectomy was carried out. The Synovium was thickened and hypertrophic. The PCL and MCL were carefully protected throughout the entire procedure. The [employer] robotic arm System was brought into the operating room and the distal femur was mapped. A 10 mm thick cut was made off the distal femur. A measuring guide was used and # 8 standard posterior cruciate retaining femoral component jig was found to be the best fit. Anterior, posterior and chamfer cuts were created. Care was taken to prevent creating a distal femoral notch. The proximal tibia was then mapped with navigation. 4 mm of the bone was resected off the medial tibial plateau.A # E right tibia was found to be the best fit.  An appropriate tibial slope was built into the cut to match the normal anatomy.  A trial reduction was carried out. Rotation and orientation of the  components were carefully marked. Flexion and extension gaps were checked and found to be symmetric. The stability of the components was checked in full extension, mid- flexion and full flexion.The patella was everted, it was measured and cut. Patellar Tracking was excellent. A Lateral release was not deemed necessary. Femoral lug holes were drilled. The Proximal Tibia was die punched. Patellar anchor holes were drilled.  The posterior capsular structures and the subperiosteal ligamentous insertions were infiltrated with Exparel as a local anesthetic.    The cancellous bone was lavaged with a Pulse lavage  and dried.  We also used diluted Betadine as an antiseptic rinse solution.  Cement was mixed on the back table. Components were cemented into position sequentially, starting with the # E right tibial component and going to the # 8 standard posterior cruciate retaining femur and then the # 35 patella. The excess amounts of bone cement were removed from the bone-metal interface. Trial reduction was carried out once the cement was fully cured. A 11 mm tibial polyethylene insert, MC medially constrained design insert, was then locked into position on the tibial tray. Wound was lavaged with antibiotic containing irrigating solution. Tourniquet was deflated, hemostasis achieved. An analgesic cocktail was injected intra-articularly into the joint capsule and ligamentous insertions on bone for post op pain relief. The arthrotomy was repaired in 60 degrees of flexion. Subcutaneous sutures were applied. Occlusive dressing was applied. No complications were encountered. Sponge count and needle count were correct. The patient was reversed from anesthesia and taken from the operating room to the recovery room in stable condition. I discussed the satisfactory performance of this procedure with the patient’s family and answered all questions for them.       Kamar Alcantar MD  12/8/2023  07:38 EST

## 2023-12-08 NOTE — ANESTHESIA POSTPROCEDURE EVALUATION
Patient: Rain Camarena    Procedure Summary       Date: 12/08/23 Room / Location:  [facility] [address] [place] [address] [facility]    Anesthesia Start: 0710 Anesthesia Stop: 0912    Procedure: Right total knee arthroplasty with navigation and robotics (Right: Knee) Diagnosis:       Primary osteoarthritis of right knee      (Primary osteoarthritis of right knee [M17.11])    Surgeons: Kamar Alcantar MD Provider: Emelia Rojas MD    Anesthesia Type: general with block ASA Status: 2            Anesthesia Type: general with block    Vitals  Vitals Value Taken Time   /66 12/08/23 1030   Temp 36.5 °C (97.7 °F) 12/08/23 1039   Pulse 75 12/08/23 1041   Resp 16 12/08/23 1030   SpO2 93 % 12/08/23 1041   Vitals shown include unfiled device data.        Post Anesthesia Care and Evaluation    Patient location during evaluation: bedside  Patient participation: complete - patient participated  Level of consciousness: awake  Pain management: adequate    Airway patency: patent  Anesthetic complications: No anesthetic complications  PONV Status: controlled  Cardiovascular status: acceptable  Respiratory status: acceptable  Hydration status: acceptable    Comments: /66   Pulse 72   Temp 36.5 °C (97.7 °F) (Oral)   Resp 16   SpO2 95%

## 2023-12-08 NOTE — PLAN OF CARE
Goal Outcome Evaluation:  Plan of Care Reviewed With: family, patient           Outcome Evaluation: Pt seen for PT [facility] this afternoon. SHe is POD 0 R TKR and presents w expected post op pain and weakness. She is sleepy, but doing well and plans home today. Pt able to stand w CGA using Rwx. She ambulated approx 60 ft w Rwx and CGA. Cues for sequence, no unsteadiness noted. Pt also tolerating knee exercises well. Increased time spent w pt and family educating on rehap progression and exercise program until they are seen by outpatien PT later next week. All questions answered at this time, no further concerns. Pt is safe to DC home from PT standpoint.      Anticipated Discharge Disposition (PT): home with assist, home with outpatient therapy services

## 2023-12-08 NOTE — ANESTHESIA PROCEDURE NOTES
Peripheral Block    Pre-sedation assessment completed: 12/8/2023 6:32 AM    Patient reassessed immediately prior to procedure    Patient location during procedure: holding area  Start time: 12/8/2023 6:33 AM  Stop time: 12/8/2023 6:30 AM  Reason for block: at surgeon's request and post-op pain management  Performed by  Anesthesiologist: Emelia Rojas MD  Preanesthetic Checklist  Completed: patient identified, IV checked, site marked, risks and benefits discussed, surgical consent, monitors and equipment checked, pre-op evaluation and timeout performed  Prep:  Pt Position: sitting  Sterile barriers:cap, gloves, mask and sterile barriers  Prep: ChloraPrep  Patient monitoring: blood pressure monitoring, continuous pulse oximetry and EKG  Procedure    Sedation: yes  Performed under: PNB  Guidance:ultrasound guided    ULTRASOUND INTERPRETATION.  Using ultrasound guidance a 22 G gauge needle was placed in close proximity to the femoral nerve, at which point, under ultrasound guidance anesthetic was injected in the area of the nerve and spread of the anesthesia was seen on ultrasound in close proximity thereto.  There were no abnormalities seen on ultrasound; a digital image was taken; and the patient tolerated the procedure with no complications. Images:still images obtained    Laterality:right  Block Type:adductor canal block  Injection Technique:single-shot  Needle Type:echogenic  Resistance on Injection: none  Catheter Size:20 G    Medications Used: Mepivacaine HCl (PF) (CARBOCAINE) 1.5 % injection - Injection   5 mL - 12/8/2023 6:33:00 AM  ropivacaine (NAROPIN) 0.5 % injection - Injection   20 mL - 12/8/2023 6:33:00 AM      Medications  Comment:U/S guidance used for needle placement & medication dispersement.    Photo placed in chart.    Pt reports rxn to local anesthetic an error - has previously tolerated w/o incident.      Post Assessment  Injection Assessment: negative aspiration for heme, no  paresthesia on injection and incremental injection  Patient Tolerance:comfortable throughout block  Complications:no

## 2023-12-11 ENCOUNTER — TELEPHONE (OUTPATIENT)
Dept: ORTHOPEDIC SURGERY | Facility: HOSPITAL | Age: 63
End: 2023-12-11
Payer: COMMERCIAL

## 2023-12-11 DIAGNOSIS — M17.11 PRIMARY OSTEOARTHRITIS OF RIGHT KNEE: ICD-10-CM

## 2023-12-11 RX ORDER — OXYCODONE HYDROCHLORIDE AND ACETAMINOPHEN 5; 325 MG/1; MG/1
1-2 TABLET ORAL EVERY 4 HOURS PRN
Qty: 40 TABLET | Refills: 0 | Status: SHIPPED | OUTPATIENT
Start: 2023-12-11

## 2023-12-11 RX ORDER — ONDANSETRON 4 MG/1
4 TABLET, FILM COATED ORAL EVERY 8 HOURS PRN
Qty: 40 TABLET | Refills: 0 | Status: SHIPPED | OUTPATIENT
Start: 2023-12-11

## 2023-12-11 NOTE — TELEPHONE ENCOUNTER
Post op day 3  Discharge Instructions:  Ask patient about his or her discharge instructions  ?  Patient confirmed understanding   []  Further instruction needed   What, if any, recommendations, teaching, or interventions did you provide? Click or tap here to enter text.  Health status:  Pain controlled Yes   Pain is well controlled. She is taking Tylenol as needed.   Recommended interventions:  Yes  incision/dressing status   ?  Clean without redness, drainage, odor  []  Redness    []  Drainage - color Click or tap here to enter text.  []  Odor  VANDANA - Green light blinking Yes  Difficulties urination No  Last BM 12/11/2023 (if no BM by day 3-recommend OTC suppository or fleets enema)  No issues   Medications:  ?Medications reviewed with patient/family/caregiver  Patient taking medications as prescribed?   Yes  If not taking medications as prescribed, note specific medicine(s) and reason for each:  Click or tap here to enter text.  Hospital Follow Up Plan:  Follow up Appointment with Orthopedic surgeon:  ?Has f/u appointment                []Scheduled f/u appointment  Home Care ordered at discharge?    No        Home Care started, or contact made?    No   If no, action taken: Starting OP PT this week   DME obtained/used in home?         Yes   Using IS  Choose an item.   Other information: Ms. Camarena said she is doing ok. She is doing the exercises on her own as her OP PT doesn't start till the 14th. She is walking, elevating, and doing exercises. She is icing. Dressing looks good. We discussed the dressing and use of the ace wrap. She voiced understanding. She said pain is minimal. She has just been taking Tylenol. We discussed Tylenol limits and supplementing the oxycodone.  She had some questions about elevation and sleeping as she is a side sleeper. We discussed the need to keep her leg elevated on a pillow. BM's are good. She said things are going well. Ms. Camarena doesn't have any other questions for me at  this time. She has my contact information should she need anything.

## 2023-12-11 NOTE — TELEPHONE ENCOUNTER
From: Rain Camarena  To: Office of Kamar Alcantar  Sent: 12/11/2023 8:49 AM EST  Subject: Medication Renewal Request    Refills have been requested for the following medications:     ondansetron (Zofran) 4 MG tablet [Kamar Alcantar]     oxyCODONE-acetaminophen (PERCOCET) 5-325 MG per tablet [Kamar Alcantar]    Preferred pharmacy: [facility] [id number] [facility] [address] [place] [facility] - [phone]  - [phone]  Delivery method: Pickup

## 2023-12-15 DIAGNOSIS — Z96.651 S/P TKR (TOTAL KNEE REPLACEMENT), RIGHT: Primary | ICD-10-CM

## 2023-12-20 DIAGNOSIS — M17.11 PRIMARY OSTEOARTHRITIS OF RIGHT KNEE: ICD-10-CM

## 2023-12-21 RX ORDER — OXYCODONE HYDROCHLORIDE AND ACETAMINOPHEN 5; 325 MG/1; MG/1
1-2 TABLET ORAL EVERY 4 HOURS PRN
Qty: 40 TABLET | Refills: 0 | Status: SHIPPED | OUTPATIENT
Start: 2023-12-21

## 2023-12-22 ENCOUNTER — OFFICE VISIT (OUTPATIENT)
Dept: ORTHOPEDIC SURGERY | Facility: CLINIC | Age: 63
End: 2023-12-22
Payer: COMMERCIAL

## 2023-12-22 ENCOUNTER — HOSPITAL ENCOUNTER (OUTPATIENT)
Dept: GENERAL RADIOLOGY | Facility: HOSPITAL | Age: 63
Discharge: HOME | End: 2023-12-22
Admitting: PHYSICIAN ASSISTANT
Payer: COMMERCIAL

## 2023-12-22 VITALS — HEIGHT: 68 IN | TEMPERATURE: 96.9 F | WEIGHT: 195 LBS | BODY MASS INDEX: 29.55 KG/M2

## 2023-12-22 DIAGNOSIS — Z96.651 S/P TKR (TOTAL KNEE REPLACEMENT), RIGHT: Primary | ICD-10-CM

## 2023-12-22 DIAGNOSIS — Z96.651 S/P TKR (TOTAL KNEE REPLACEMENT), RIGHT: ICD-10-CM

## 2023-12-22 PROCEDURE — 99024 POSTOP FOLLOW-UP VISIT: CPT | Performed by: PHYSICIAN ASSISTANT

## 2023-12-22 PROCEDURE — 73560 X-RAY EXAM OF KNEE 1 OR 2: CPT

## 2023-12-22 NOTE — PROGRESS NOTES
POST OPERATIVE FOLLOW UP (Global)      NAME: Rain Camarena           : 1960    MRN: [id number]      Chief Complaint   Patient presents with    Right Knee - Post-op     Date of Surgery: 23  ?     HPI  Rain Camarena presents for 2 week postoperative follow up s/p right total knee arthroplasty performed by Kamar Alcantar MD. The patient has had a relatively normal postoperative course.  The patient has had improving normal postoperative pain.  The patient has had no issues with the wound. She is in PT with [facility]. She just received a refill on her pain medication yesterday. She is using about 1/2-1 tablet of pain medication every 4 days.   She has a history of gastric bypass so cannot change over to ASA after her eliquis is completed.        Physical Exam  General: alert, appears stated age, cooperative, and no distress  Incision/Skin: healing well, no drainage, no erythema, no seroma, no swelling, incision well approximated  Musculoskeletal:   RIGHT knee  Calf is soft and nontender with a negative Homans sign. Appropriate amounts of swelling and bruising are noted.  There is no clicking, popping or catching. There is no instability of the components.   Anterior and posterior drawer signs are negative.   Dorsalis pedis and posterior tibial artery pulses are palpable.   Common peroneal nerve function is well preserved.   Range of motion is 2-88 degrees of flexion.  Gait is cautious but otherwise fairly normal.       Diagnostic Data:  RIGHT knee xrays  weightbearing/standing 2 views were ordered by Mamadou Tubbs PA-C. Performed at [facility] on 2023. Images were independently viewed and interpreted by myself, my impression as follows:  Findings: Well positioned implant with good cement mantle  Bony Lesion: No  Soft tissues: within normal limits  Joint spaces: WNL  Hardware appropriately positioned: yes  Prior studies available for comparison: yes          Assessment & Plan    Assessment:    1. S/P TKR (total knee replacement), right          Plan:      New Medications Ordered This Visit   Medications    apixaban (ELIQUIS) 2.5 MG tablet tablet     Sig: Take 1 tablet by mouth 2 (Two) Times a Day.     Dispense:  28 tablet     Refill:  0     Wound care instructions given  Ice and/or modalities as beneficial  Watch for signs and symptoms of infection  Physical therapy program ongoing  Weight bearing parameters reviewed  Take prescribed medications as instructed, but only as tolerated  Aftercare and dental prophylaxis for joint replacement surgery.  Discussion of orthopaedic goals and activities and patient and/or guardian expressed appreciation.  Follow up in 4-6 weeks  Refill the eliquis for 2 more weeks (a total of 6 weeks) since she cannot take ASA.         Patient Instructions   After your blood thinner (Eliquis or Xarelto) has been completed, you should begin a full dose aspirin 325 mg enteric-coated and continue taking this until 6 weeks after your surgery date   You may now uncover the incision and get your incision wet, but you should NOT soak in a bath tub, hot tub, swimming pool, or submerge the incision until completely healed. You may gently wash the area with antibacterial soap. Prior to redressing the incision the area should be completely dried.  Be sure to look at the incision daily to make sure it is not showing signs of infection (redness, pus coming from the incision). Do NOT apply any type of ointment to your surgical incision or lotion         near it.   Continue to ice and elevate the extremity to decrease swelling when possible.  You should continue to keep the surgical leg wrapped with Ace bandage or use a dyan hose/compression stocking for another 2 weeks.  You should keep your incision covered with a bandage until it has completely healed.  You should not have dental cleaning or dental work for at least 6 months following surgery. After that time, if you have dental  work/cleaning or surgery on the genitourinary area, you should let the physician know prior to that time, or let our office know, so an antibiotic can be sent to your pharmacy to take prior to the procedure. This is will a lifetime requirement to decrease any chance of infection to the joint.         Mamadou Tubbs PA-C  12/22/2023     Electronically signed by Mamadou Tubbs PA-C, 12/22/23, 10:36 AM EST.   EMR Dragon/Transcription disclaimer:  Much of this encounter note is an electronic transcription/translation of spoken language to printed text. The electronic translation of spoken language may permit erroneous, or at times, nonsensical words or phrases to be inadvertently transcribed; Although I have reviewed the note for such errors, some may still exist.

## 2023-12-22 NOTE — PATIENT INSTRUCTIONS
After your blood thinner (Eliquis or Xarelto) has been completed, you should begin a full dose aspirin 325 mg enteric-coated and continue taking this until 6 weeks after your surgery date   You may now uncover the incision and get your incision wet, but you should NOT soak in a bath tub, hot tub, swimming pool, or submerge the incision until completely healed. You may gently wash the area with antibacterial soap. Prior to redressing the incision the area should be completely dried.  Be sure to look at the incision daily to make sure it is not showing signs of infection (redness, pus coming from the incision). Do NOT apply any type of ointment to your surgical incision or lotion         near it.   Continue to ice and elevate the extremity to decrease swelling when possible.  You should continue to keep the surgical leg wrapped with Ace bandage or use a dyan hose/compression stocking for another 2 weeks.  You should keep your incision covered with a bandage until it has completely healed.  You should not have dental cleaning or dental work for at least 6 months following surgery. After that time, if you have dental work/cleaning or surgery on the genitourinary area, you should let the physician know prior to that time, or let our office know, so an antibiotic can be sent to your pharmacy to take prior to the procedure. This is will a lifetime requirement to decrease any chance of infection to the joint.

## 2023-12-29 DIAGNOSIS — M17.11 PRIMARY OSTEOARTHRITIS OF RIGHT KNEE: ICD-10-CM

## 2024-01-05 ENCOUNTER — TELEPHONE (OUTPATIENT)
Dept: ORTHOPEDIC SURGERY | Facility: HOSPITAL | Age: 64
End: 2024-01-05
Payer: COMMERCIAL

## 2024-01-05 RX ORDER — OXYCODONE HYDROCHLORIDE AND ACETAMINOPHEN 5; 325 MG/1; MG/1
1-2 TABLET ORAL EVERY 4 HOURS PRN
Qty: 40 TABLET | Refills: 0 | Status: SHIPPED | OUTPATIENT
Start: 2024-01-05

## 2024-01-05 NOTE — TELEPHONE ENCOUNTER
Called and spoke with Ms. Camarena at this time to see how she has been doing since her RTK 12/8. She said she is doing well. She is working with PT and it hurts, but otherwise she feels she is making some progress. She said she does have an issue with her medication. She got a refill from her primary 12/21 and requested another refill 12/29. She said she called her pharmacy and they don't have anything for her. Looking in epic it looks as if a request was sent in 12/29 at 1800. I have attempted to call her pharmacy twice to confirm receipt of the request and no one will  forcing me to leave a message. Request to re-send order sent to MD at this time.   Otherwise, Ms. Camarena said she is doing well and doesn't take the pain medication except when she goes to therapy. She doesn't have any questions for me at this time. Ms. Camarena has my contact information should she need anything.     Requesting a refill on pain medication as the one sent 12/29 never went through (still pending in epic)  Medication: Percocet 5/325  Surgery date: 12/8  Last fill: 12/21  Pharmacy: [facility] in [place]  [phone]

## 2024-07-31 ENCOUNTER — TRANSCRIBE ORDERS (OUTPATIENT)
Dept: ADMINISTRATIVE | Facility: HOSPITAL | Age: 64
End: 2024-07-31
Payer: COMMERCIAL

## 2024-07-31 DIAGNOSIS — G89.29 CHRONIC PAIN OF LEFT KNEE: Primary | ICD-10-CM

## 2024-07-31 DIAGNOSIS — M25.562 CHRONIC PAIN OF LEFT KNEE: Primary | ICD-10-CM

## 2024-08-15 ENCOUNTER — HOSPITAL ENCOUNTER (OUTPATIENT)
Dept: MRI IMAGING | Facility: HOSPITAL | Age: 64
Discharge: HOME | End: 2024-08-15
Admitting: PHYSICIAN ASSISTANT
Payer: COMMERCIAL

## 2024-08-15 DIAGNOSIS — G89.29 CHRONIC PAIN OF LEFT KNEE: ICD-10-CM

## 2024-08-15 DIAGNOSIS — M25.562 CHRONIC PAIN OF LEFT KNEE: ICD-10-CM

## 2024-08-15 PROCEDURE — 73721 MRI JNT OF LWR EXTRE W/O DYE: CPT

## (undated) DEVICE — DUAL CUT SAGITTAL BLADE

## (undated) DEVICE — PATIENT RETURN ELECTRODE, SINGLE-USE, CONTACT QUALITY MONITORING, ADULT, WITH 9FT CORD, FOR PATIENTS WEIGING OVER 33LBS. (15KG): Brand: MEGADYNE

## (undated) DEVICE — GLV SURG SENSICARE PI LF PF 8 GRN STRL

## (undated) DEVICE — BNDG ELAS ELITE V/CLOSE 6IN 5YD LF STRL

## (undated) DEVICE — TBG PENCL TELESCP MEGADYNE SMOKE EVAC 10FT

## (undated) DEVICE — 450 ML BOTTLE OF 0.05% CHLORHEXIDINE GLUCONATE IN 99.95% STERILE WATER FOR IRRIGATION, USP AND APPLICATOR.: Brand: IRRISEPT ANTIMICROBIAL WOUND LAVAGE

## (undated) DEVICE — PREP SOL POVIDONE/IODINE BT 4OZ

## (undated) DEVICE — PIN TEMP FIX ORTHOSOFT CAS FLUT 3.2X150MM STRL 1P/U

## (undated) DEVICE — UNDERCAST PADDING: Brand: DEROYAL

## (undated) DEVICE — NEEDLE, QUINCKE, 18GX3.5": Brand: MEDLINE

## (undated) DEVICE — DRP ROBOTIC ROSA BX/20

## (undated) DEVICE — ANTIBACTERIAL UNDYED BRAIDED (POLYGLACTIN 910), SYNTHETIC ABSORBABLE SUTURE: Brand: COATED VICRYL

## (undated) DEVICE — STERILE PATIENT PROTECTIVE PAD FOR IMP® KNEE POSITIONERS & COHESIVE WRAP (10 / CASE): Brand: DE MAYO KNEE POSITIONER®

## (undated) DEVICE — GLV SURG PREMIERPRO ORTHO LTX PF SZ8 BRN

## (undated) DEVICE — APPL CHLORAPREP HI/LITE 26ML ORNG

## (undated) DEVICE — PK KN TOTL 40

## (undated) DEVICE — COVER,MAYO STAND,STERILE: Brand: MEDLINE

## (undated) DEVICE — DRSNG SLVR/ANTIBAC PRIMASEAL POST/OP ADHS 3.5X12IN

## (undated) DEVICE — ADHS SKIN SURG TISS VISC PREMIERPRO EXOFIN HI/VISC FAST/DRY

## (undated) DEVICE — DRAPE,C-SECTION,CLR SCREEN,WIRE: Brand: MEDLINE

## (undated) DEVICE — GLV SURG SENSICARE PI PF LF 7 GRN STRL

## (undated) DEVICE — INTEGUSEAL MICROBIAL SEALANT: Brand: AVANOS

## (undated) DEVICE — MAT FLR ABSORBENT LG 4FT 10 2.5FT

## (undated) DEVICE — SOL IRR NACL 0.9PCT 3000ML

## (undated) DEVICE — KT NAVITRACKER KN ORTHOSOFT CIS STRL

## (undated) DEVICE — TRAP FLD MINIVAC MEGADYNE 100ML